# Patient Record
Sex: FEMALE | Race: WHITE | NOT HISPANIC OR LATINO | Employment: UNEMPLOYED | URBAN - NONMETROPOLITAN AREA
[De-identification: names, ages, dates, MRNs, and addresses within clinical notes are randomized per-mention and may not be internally consistent; named-entity substitution may affect disease eponyms.]

---

## 2017-01-09 ENCOUNTER — HOSPITAL ENCOUNTER (OUTPATIENT)
Dept: URGENT CARE | Facility: CLINIC | Age: 22
Discharge: HOME | End: 2017-01-09
Attending: FAMILY MEDICINE | Admitting: FAMILY MEDICINE

## 2017-01-16 ENCOUNTER — ROUTINE PRENATAL (OUTPATIENT)
Dept: OBSTETRICS AND GYNECOLOGY | Facility: CLINIC | Age: 22
End: 2017-01-16

## 2017-01-16 VITALS — BODY MASS INDEX: 27.98 KG/M2 | DIASTOLIC BLOOD PRESSURE: 74 MMHG | WEIGHT: 153 LBS | SYSTOLIC BLOOD PRESSURE: 115 MMHG

## 2017-01-16 DIAGNOSIS — Z3A.22 22 WEEKS GESTATION OF PREGNANCY: ICD-10-CM

## 2017-01-16 DIAGNOSIS — Z34.82 PRENATAL CARE, SUBSEQUENT PREGNANCY, SECOND TRIMESTER: Primary | ICD-10-CM

## 2017-01-16 PROCEDURE — 99212 OFFICE O/P EST SF 10 MIN: CPT | Performed by: ADVANCED PRACTICE MIDWIFE

## 2017-01-16 RX ORDER — PSEUDOEPHEDRINE HCL 30 MG
30 TABLET ORAL 2 TIMES DAILY
COMMUNITY
End: 2017-02-20 | Stop reason: HOSPADM

## 2017-01-16 NOTE — MR AVS SNAPSHOT
Swapna Zarate   2017 10:00 AM   Routine Prenatal    Dept Phone:  [phone]   Encounter #:  [id number]    Provider:  Alda Jernigan CNM   Department:  [facility]                Your Full Care Plan              Today's Medication Changes          These changes are accurate as of: 17 10:27 AM.  If you have any questions, ask your nurse or doctor.               Stop taking medication(s)listed here:     ondansetron ODT 4 MG disintegrating tablet   Commonly known as:  ZOFRAN ODT                      Your Updated Medication List          This list is accurate as of: 17 10:27 AM.  Always use your most recent med list.                docusate sodium 100 MG capsule   Commonly known as:  COLACE   Take 1 capsule by mouth 2 (Two) Times a Day As Needed for constipation.       prenatal (CLASSIC) vitamin 28-0.8 MG tablet tablet       pseudoephedrine 30 MG tablet   Commonly known as:  SUDAFED               You Were Diagnosed With        Codes Comments    Prenatal care, subsequent pregnancy, third trimester    -  Primary ICD-10-CM: Z34.83  ICD-9-CM: V22.1       Instructions     None    Patient Instructions History      Upcoming Appointments     Visit Type Date Time Department    OB FOLLOWUP 2017 10:00 AM  [facility]    OB FOLLOWUP 2017  9:00 AM [facility] [place] Signup     [facility] [employer] allows you to send messages to your doctor, view your test results, renew your prescriptions, schedule appointments, and more. To sign up, go to [employer] and click on the Sign Up Now link in the New User? box. Enter your "[employer] Activation Code exactly as it appears below along with the last four digits of your Social Security Number and your Date of Birth () to complete the sign-up process. If you do not sign up before the expiration date, you must request a new code.    "[employer] Activation Code: [email or web address]  Expires:  1/30/2017 10:27 AM    If you have questions, you can email [email or web address] or call [phone] to talk to our MyChart staff. Remember, [employer] is NOT to be used for urgent needs. For medical emergencies, dial 911.               Other Info from Your Visit           Your Appointments     Feb 13, 2017  9:00 AM CST   OB FOLLOWUP with DANIEL Jacobs   [facility] (--)    [address] [place] [address] Dr  [facility] [address] [place] [id number] [phone]              Other Notes About Your Plan     EIF seen on ultrasound: repeat ultrasound at 32 weeks.        Allergies     No Known Allergies      Reason for Visit     Routine Prenatal Visit           Vital Signs     Blood Pressure Weight Last Menstrual Period Body Mass Index Smoking Status       115/74 153 lb (69.4 kg) (LMP Unknown) 27.98 kg/m2 Never Smoker       Problems and Diagnoses Noted     Prenatal care    -  Primary

## 2017-01-16 NOTE — PROGRESS NOTES
Next visit: diabetes screening  Presents for return OB appointment. ROS: Denies nausea, cramping, spotting, dysuria.  Discussed diabetes and anemia screening for next visit. Discussed weight loss. Patient states her nausea and vomiting has improved and therefore her appetite.

## 2017-02-13 ENCOUNTER — RESULTS ENCOUNTER (OUTPATIENT)
Dept: OBSTETRICS AND GYNECOLOGY | Facility: CLINIC | Age: 22
End: 2017-02-13

## 2017-02-13 DIAGNOSIS — Z34.82 PRENATAL CARE, SUBSEQUENT PREGNANCY, SECOND TRIMESTER: ICD-10-CM

## 2017-02-20 ENCOUNTER — LAB (OUTPATIENT)
Dept: LAB | Facility: HOSPITAL | Age: 22
End: 2017-02-20

## 2017-02-20 ENCOUNTER — ROUTINE PRENATAL (OUTPATIENT)
Dept: OBSTETRICS AND GYNECOLOGY | Facility: CLINIC | Age: 22
End: 2017-02-20

## 2017-02-20 VITALS — WEIGHT: 163 LBS | BODY MASS INDEX: 29.81 KG/M2 | SYSTOLIC BLOOD PRESSURE: 125 MMHG | DIASTOLIC BLOOD PRESSURE: 72 MMHG

## 2017-02-20 DIAGNOSIS — O09.292 PREGNANCY WITH HISTORY OF SPONTANEOUS ABORTION, SECOND TRIMESTER: Primary | ICD-10-CM

## 2017-02-20 DIAGNOSIS — Z34.82 PRENATAL CARE, SUBSEQUENT PREGNANCY, SECOND TRIMESTER: ICD-10-CM

## 2017-02-20 DIAGNOSIS — Z3A.27 27 WEEKS GESTATION OF PREGNANCY: ICD-10-CM

## 2017-02-20 LAB
BASOPHILS # BLD AUTO: 0.01 10*3/MM3 (ref 0–0.2)
BASOPHILS NFR BLD AUTO: 0.1 % (ref 0–2)
DEPRECATED RDW RBC AUTO: 42.5 FL (ref 36.4–46.3)
EOSINOPHIL # BLD AUTO: 0.09 10*3/MM3 (ref 0–0.7)
EOSINOPHIL NFR BLD AUTO: 0.9 % (ref 0–7)
ERYTHROCYTE [DISTWIDTH] IN BLOOD BY AUTOMATED COUNT: 13.1 % (ref 11.5–14.5)
GLUCOSE 1H P 100 G GLC PO SERPL-MCNC: 116 MG/DL (ref 60–140)
HCT VFR BLD AUTO: 33.3 % (ref 35–45)
HGB BLD-MCNC: 11.4 G/DL (ref 12–15.5)
IMM GRANULOCYTES # BLD: 0.01 10*3/MM3 (ref 0–0.02)
IMM GRANULOCYTES NFR BLD: 0.1 % (ref 0–0.5)
LYMPHOCYTES # BLD AUTO: 2.42 10*3/MM3 (ref 0.6–4.2)
LYMPHOCYTES NFR BLD AUTO: 25.1 % (ref 10–50)
MCH RBC QN AUTO: 30.5 PG (ref 26.5–34)
MCHC RBC AUTO-ENTMCNC: 34.2 G/DL (ref 31.4–36)
MCV RBC AUTO: 89 FL (ref 80–98)
MONOCYTES # BLD AUTO: 0.39 10*3/MM3 (ref 0–0.9)
MONOCYTES NFR BLD AUTO: 4 % (ref 0–12)
NEUTROPHILS # BLD AUTO: 6.74 10*3/MM3 (ref 2–8.6)
NEUTROPHILS NFR BLD AUTO: 69.8 % (ref 37–80)
PLATELET # BLD AUTO: 183 10*3/MM3 (ref 150–450)
PMV BLD AUTO: 11 FL (ref 8–12)
RBC # BLD AUTO: 3.74 10*6/MM3 (ref 3.77–5.16)
WBC NRBC COR # BLD: 9.66 10*3/MM3 (ref 3.2–9.8)

## 2017-02-20 PROCEDURE — 36415 COLL VENOUS BLD VENIPUNCTURE: CPT | Performed by: ADVANCED PRACTICE MIDWIFE

## 2017-02-20 PROCEDURE — 85025 COMPLETE CBC W/AUTO DIFF WBC: CPT | Performed by: ADVANCED PRACTICE MIDWIFE

## 2017-02-20 PROCEDURE — 82950 GLUCOSE TEST: CPT | Performed by: ADVANCED PRACTICE MIDWIFE

## 2017-02-20 PROCEDURE — 99212 OFFICE O/P EST SF 10 MIN: CPT | Performed by: ADVANCED PRACTICE MIDWIFE

## 2017-02-20 NOTE — PROGRESS NOTES
Next appointment: Schedule ultrasound follow up at 32 weeks for EIF    Patient presents for follow up OB and diabetes screening. ROS: Reports increased heartburn and is taking Zantac OTC. Denies nausea, vomiting, spotting, dizziness. Reports a rash on both wrists and dry knuckles. Appeared about a week ago. Denies fever, body aches, or chills. Denies exposure to scabies or bed bugs.    Exam: Multiple small clustered pinpoint raised bumps on wrists. Negative for tracks. Skin appears dry and scaly.     Instructed patient to switch to hypoallergenic products and to use Benadryl. Patient will notify of worsening or not improving symptoms. Educated patient of birth control options, pediatricians,  labor symptoms. Patient requested flu shot.

## 2017-03-10 ENCOUNTER — ROUTINE PRENATAL (OUTPATIENT)
Dept: OBSTETRICS AND GYNECOLOGY | Facility: CLINIC | Age: 22
End: 2017-03-10

## 2017-03-10 VITALS — BODY MASS INDEX: 30 KG/M2 | SYSTOLIC BLOOD PRESSURE: 109 MMHG | DIASTOLIC BLOOD PRESSURE: 71 MMHG | WEIGHT: 164 LBS

## 2017-03-10 DIAGNOSIS — O09.293 PREGNANCY WITH HISTORY OF SPONTANEOUS ABORTION, THIRD TRIMESTER: Primary | ICD-10-CM

## 2017-03-10 DIAGNOSIS — Z3A.29 29 WEEKS GESTATION OF PREGNANCY: ICD-10-CM

## 2017-03-10 DIAGNOSIS — Z03.79 SUSPECTED FETAL CONDITION NOT FOUND: ICD-10-CM

## 2017-03-10 PROCEDURE — 99212 OFFICE O/P EST SF 10 MIN: CPT | Performed by: ADVANCED PRACTICE MIDWIFE

## 2017-03-10 NOTE — PROGRESS NOTES
Next appointment: follow up ultrasound for EIF  Patient presents for return OB visit. ROS: Patient reports occasional pain/discomfort between her shoulder blades. States that it will be constant and then subside. Patient denies nausea, vomiting, chest pain, headache.    Reviewed patient's diet and is currently consuming a majority of fatty, greasy foods. Instructed to decrease this and see if this relieves her discomfort. Discussed comfort measures and warning signs of worsening gallbladder symptoms. States understanding.

## 2017-03-24 ENCOUNTER — ROUTINE PRENATAL (OUTPATIENT)
Dept: OBSTETRICS AND GYNECOLOGY | Facility: CLINIC | Age: 22
End: 2017-03-24

## 2017-03-24 VITALS — DIASTOLIC BLOOD PRESSURE: 66 MMHG | WEIGHT: 167 LBS | SYSTOLIC BLOOD PRESSURE: 111 MMHG | BODY MASS INDEX: 30.54 KG/M2

## 2017-03-24 DIAGNOSIS — Z3A.31 31 WEEKS GESTATION OF PREGNANCY: ICD-10-CM

## 2017-03-24 DIAGNOSIS — O09.293 PREGNANCY WITH HISTORY OF SPONTANEOUS ABORTION, THIRD TRIMESTER: Primary | ICD-10-CM

## 2017-03-24 PROCEDURE — 0502F SUBSEQUENT PRENATAL CARE: CPT | Performed by: ADVANCED PRACTICE MIDWIFE

## 2017-03-24 NOTE — PROGRESS NOTES
Patient presents for follow up ultrasound due to EIF. ROS: denies spotting, headache, dizziness, dysuria    Ultrasound shows EF 4 lb 8 oz (61%), MARY 25.9cm, [id number], cephalic presentation, anterior placenta, 3 vessel cord, anatomy within normal limits.     Awaiting final report for follow up due to MARY.

## 2017-04-07 ENCOUNTER — ROUTINE PRENATAL (OUTPATIENT)
Dept: OBSTETRICS AND GYNECOLOGY | Facility: CLINIC | Age: 22
End: 2017-04-07

## 2017-04-07 VITALS — SYSTOLIC BLOOD PRESSURE: 118 MMHG | WEIGHT: 172 LBS | DIASTOLIC BLOOD PRESSURE: 71 MMHG | BODY MASS INDEX: 31.46 KG/M2

## 2017-04-07 DIAGNOSIS — Z3A.33 33 WEEKS GESTATION OF PREGNANCY: ICD-10-CM

## 2017-04-07 DIAGNOSIS — O09.293 PREGNANCY WITH HISTORY OF SPONTANEOUS ABORTION, THIRD TRIMESTER: Primary | ICD-10-CM

## 2017-04-07 PROCEDURE — 0502F SUBSEQUENT PRENATAL CARE: CPT | Performed by: ADVANCED PRACTICE MIDWIFE

## 2017-04-07 NOTE — PROGRESS NOTES
Next visit: GBS swab  CC: OLEG visit, history reviewed see history tabs.     ROS:   Negative leaking fluid from the vagina, swelling in her legs, headache, visual changes, low back pain, nausea and dysuria    Educated on: [facility], GBS swab    Plan: f/u in 2 week/s    NANDA Fleming   I have reviewed the notes, assessments, and/or procedures performed by Pauline bennett, I concur with her/his documentation of Swapna Zarate.

## 2017-04-19 RX ORDER — VALACYCLOVIR HYDROCHLORIDE 500 MG/1
TABLET, FILM COATED ORAL
Qty: 30 TABLET | Refills: 1 | Status: SHIPPED | OUTPATIENT
Start: 2017-04-19 | End: 2017-04-24 | Stop reason: SDUPTHER

## 2017-04-19 RX ORDER — VALACYCLOVIR HYDROCHLORIDE 500 MG/1
500 TABLET, FILM COATED ORAL 2 TIMES DAILY
Qty: 6 TABLET | Refills: 0 | Status: SHIPPED | OUTPATIENT
Start: 2017-04-19 | End: 2017-05-02

## 2017-04-24 ENCOUNTER — ROUTINE PRENATAL (OUTPATIENT)
Dept: OBSTETRICS AND GYNECOLOGY | Facility: CLINIC | Age: 22
End: 2017-04-24

## 2017-04-24 VITALS — BODY MASS INDEX: 31.64 KG/M2 | DIASTOLIC BLOOD PRESSURE: 73 MMHG | WEIGHT: 173 LBS | SYSTOLIC BLOOD PRESSURE: 110 MMHG

## 2017-04-24 DIAGNOSIS — Z3A.36 36 WEEKS GESTATION OF PREGNANCY: ICD-10-CM

## 2017-04-24 DIAGNOSIS — Z36.85 ANTENATAL SCREENING FOR STREPTOCOCCUS B: ICD-10-CM

## 2017-04-24 DIAGNOSIS — O09.293 PREGNANCY WITH HISTORY OF SPONTANEOUS ABORTION, THIRD TRIMESTER: Primary | ICD-10-CM

## 2017-04-24 PROCEDURE — 87653 STREP B DNA AMP PROBE: CPT | Performed by: ADVANCED PRACTICE MIDWIFE

## 2017-04-24 PROCEDURE — 0502F SUBSEQUENT PRENATAL CARE: CPT | Performed by: ADVANCED PRACTICE MIDWIFE

## 2017-04-24 RX ORDER — VALACYCLOVIR HYDROCHLORIDE 500 MG/1
TABLET, FILM COATED ORAL
Qty: 30 TABLET | Refills: 1 | Status: SHIPPED | OUTPATIENT
Start: 2017-04-24 | End: 2017-05-25 | Stop reason: HOSPADM

## 2017-04-24 NOTE — PROGRESS NOTES
Patient presents for return OB visit. Patient recently treated for herpes outbreak. Reviewed importance of suppression therapy. Patient understands recommendation for c/s if outbreak present at time of delivery. ROS: Outbreak has cleared. Denies BH contractions, spotting, dysuria.     Cervix closed/-3/posterior. GBS collected.     Suppression therapy resent to pharmacy

## 2017-04-25 LAB — GROUP B STREP, DNA: NEGATIVE

## 2017-05-02 ENCOUNTER — ROUTINE PRENATAL (OUTPATIENT)
Dept: OBSTETRICS AND GYNECOLOGY | Facility: CLINIC | Age: 22
End: 2017-05-02

## 2017-05-02 VITALS — WEIGHT: 175 LBS | DIASTOLIC BLOOD PRESSURE: 71 MMHG | BODY MASS INDEX: 32.01 KG/M2 | SYSTOLIC BLOOD PRESSURE: 106 MMHG

## 2017-05-02 DIAGNOSIS — Z3A.37 37 WEEKS GESTATION OF PREGNANCY: ICD-10-CM

## 2017-05-02 DIAGNOSIS — O09.293 PREGNANCY WITH HISTORY OF SPONTANEOUS ABORTION, THIRD TRIMESTER: Primary | ICD-10-CM

## 2017-05-02 PROCEDURE — 0502F SUBSEQUENT PRENATAL CARE: CPT | Performed by: ADVANCED PRACTICE MIDWIFE

## 2017-05-02 RX ORDER — SERTRALINE HYDROCHLORIDE 25 MG/1
TABLET, FILM COATED ORAL
Qty: 49 TABLET | Refills: 0 | Status: SHIPPED | OUTPATIENT
Start: 2017-05-02 | End: 2017-06-08

## 2017-05-10 ENCOUNTER — ROUTINE PRENATAL (OUTPATIENT)
Dept: OBSTETRICS AND GYNECOLOGY | Facility: CLINIC | Age: 22
End: 2017-05-10

## 2017-05-10 VITALS — DIASTOLIC BLOOD PRESSURE: 70 MMHG | SYSTOLIC BLOOD PRESSURE: 110 MMHG | WEIGHT: 174 LBS | BODY MASS INDEX: 31.83 KG/M2

## 2017-05-10 DIAGNOSIS — Z3A.38 38 WEEKS GESTATION OF PREGNANCY: ICD-10-CM

## 2017-05-10 DIAGNOSIS — O09.293 PREGNANCY WITH HISTORY OF SPONTANEOUS ABORTION, THIRD TRIMESTER: Primary | ICD-10-CM

## 2017-05-10 PROCEDURE — 0502F SUBSEQUENT PRENATAL CARE: CPT | Performed by: ADVANCED PRACTICE MIDWIFE

## 2017-05-15 ENCOUNTER — ROUTINE PRENATAL (OUTPATIENT)
Dept: OBSTETRICS AND GYNECOLOGY | Facility: CLINIC | Age: 22
End: 2017-05-15

## 2017-05-15 VITALS — SYSTOLIC BLOOD PRESSURE: 119 MMHG | WEIGHT: 177 LBS | BODY MASS INDEX: 32.37 KG/M2 | DIASTOLIC BLOOD PRESSURE: 82 MMHG

## 2017-05-15 DIAGNOSIS — Z3A.39 39 WEEKS GESTATION OF PREGNANCY: ICD-10-CM

## 2017-05-15 DIAGNOSIS — O48.0 POST-TERM PREGNANCY, 40-42 WEEKS OF GESTATION: ICD-10-CM

## 2017-05-15 DIAGNOSIS — O09.293 PREGNANCY WITH HISTORY OF SPONTANEOUS ABORTION, THIRD TRIMESTER: Primary | ICD-10-CM

## 2017-05-15 PROCEDURE — 0502F SUBSEQUENT PRENATAL CARE: CPT | Performed by: ADVANCED PRACTICE MIDWIFE

## 2017-05-16 ENCOUNTER — HOSPITAL ENCOUNTER (OUTPATIENT)
Facility: HOSPITAL | Age: 22
Discharge: HOME | End: 2017-05-16
Attending: OBSTETRICS & GYNECOLOGY | Admitting: OBSTETRICS & GYNECOLOGY

## 2017-05-16 VITALS
BODY MASS INDEX: 32.57 KG/M2 | RESPIRATION RATE: 18 BRPM | TEMPERATURE: 98.1 F | WEIGHT: 177 LBS | HEART RATE: 88 BPM | HEIGHT: 62 IN | DIASTOLIC BLOOD PRESSURE: 76 MMHG | SYSTOLIC BLOOD PRESSURE: 128 MMHG | OXYGEN SATURATION: 98 %

## 2017-05-16 DIAGNOSIS — N89.8 VAGINAL IRRITATION: Primary | ICD-10-CM

## 2017-05-16 PROCEDURE — G0463 HOSPITAL OUTPT CLINIC VISIT: HCPCS

## 2017-05-16 PROCEDURE — 59025 FETAL NON-STRESS TEST: CPT | Performed by: OBSTETRICS & GYNECOLOGY

## 2017-05-16 PROCEDURE — 59025 FETAL NON-STRESS TEST: CPT

## 2017-05-24 ENCOUNTER — HOSPITAL ENCOUNTER (INPATIENT)
Facility: HOSPITAL | Age: 22
LOS: 1 days | Discharge: HOME | End: 2017-05-25
Attending: OBSTETRICS & GYNECOLOGY | Admitting: OBSTETRICS & GYNECOLOGY

## 2017-05-24 PROBLEM — Z34.90 PREGNANCY: Status: ACTIVE | Noted: 2017-05-24

## 2017-05-24 LAB
ABO GROUP BLD: NORMAL
AMPHET+METHAMPHET UR QL: NEGATIVE
BARBITURATES UR QL SCN: NEGATIVE
BENZODIAZ UR QL SCN: NEGATIVE
BLD GP AB SCN SERPL QL: NEGATIVE
CANNABINOIDS SERPL QL: NEGATIVE
COCAINE UR QL: NEGATIVE
DEPRECATED RDW RBC AUTO: 43.1 FL (ref 36.4–46.3)
ERYTHROCYTE [DISTWIDTH] IN BLOOD BY AUTOMATED COUNT: 14.9 % (ref 11.5–14.5)
HCT VFR BLD AUTO: 36.5 % (ref 35–45)
HGB BLD-MCNC: 12.3 G/DL (ref 12–15.5)
HOLD SPECIMEN: NORMAL
Lab: NORMAL
MCH RBC QN AUTO: 26.6 PG (ref 26.5–34)
MCHC RBC AUTO-ENTMCNC: 33.7 G/DL (ref 31.4–36)
MCV RBC AUTO: 79 FL (ref 80–98)
METHADONE UR QL SCN: NEGATIVE
OPIATES UR QL: NEGATIVE
OXYCODONE UR QL SCN: NEGATIVE
PLATELET # BLD AUTO: 192 10*3/MM3 (ref 150–450)
PMV BLD AUTO: ABNORMAL FL (ref 8–12)
RBC # BLD AUTO: 4.62 10*6/MM3 (ref 3.77–5.16)
RH BLD: POSITIVE
WBC NRBC COR # BLD: 14.42 10*3/MM3 (ref 3.2–9.8)

## 2017-05-24 PROCEDURE — 80307 DRUG TEST PRSMV CHEM ANLYZR: CPT | Performed by: OBSTETRICS & GYNECOLOGY

## 2017-05-24 PROCEDURE — 25010000002 BUTORPHANOL PER 1 MG

## 2017-05-24 PROCEDURE — 90715 TDAP VACCINE 7 YRS/> IM: CPT | Performed by: ADVANCED PRACTICE MIDWIFE

## 2017-05-24 PROCEDURE — 90471 IMMUNIZATION ADMIN: CPT | Performed by: ADVANCED PRACTICE MIDWIFE

## 2017-05-24 PROCEDURE — 59400 OBSTETRICAL CARE: CPT | Performed by: ADVANCED PRACTICE MIDWIFE

## 2017-05-24 PROCEDURE — 85027 COMPLETE CBC AUTOMATED: CPT | Performed by: OBSTETRICS & GYNECOLOGY

## 2017-05-24 PROCEDURE — 86900 BLOOD TYPING SEROLOGIC ABO: CPT | Performed by: OBSTETRICS & GYNECOLOGY

## 2017-05-24 PROCEDURE — 25010000002 TDAP 5-2.5-18.5 LF-MCG/0.5 SUSPENSION: Performed by: ADVANCED PRACTICE MIDWIFE

## 2017-05-24 PROCEDURE — 86901 BLOOD TYPING SEROLOGIC RH(D): CPT | Performed by: OBSTETRICS & GYNECOLOGY

## 2017-05-24 PROCEDURE — 86850 RBC ANTIBODY SCREEN: CPT | Performed by: OBSTETRICS & GYNECOLOGY

## 2017-05-24 RX ORDER — MISOPROSTOL 200 UG/1
800 TABLET ORAL AS NEEDED
Status: DISCONTINUED | OUTPATIENT
Start: 2017-05-24 | End: 2017-05-24 | Stop reason: HOSPADM

## 2017-05-24 RX ORDER — SODIUM CHLORIDE 0.9 % (FLUSH) 0.9 %
1-10 SYRINGE (ML) INJECTION AS NEEDED
Status: DISCONTINUED | OUTPATIENT
Start: 2017-05-24 | End: 2017-05-24

## 2017-05-24 RX ORDER — BUTORPHANOL TARTRATE 1 MG/ML
INJECTION, SOLUTION INTRAMUSCULAR; INTRAVENOUS
Status: COMPLETED
Start: 2017-05-24 | End: 2017-05-24

## 2017-05-24 RX ORDER — SODIUM CHLORIDE, SODIUM LACTATE, POTASSIUM CHLORIDE, CALCIUM CHLORIDE 600; 310; 30; 20 MG/100ML; MG/100ML; MG/100ML; MG/100ML
125 INJECTION, SOLUTION INTRAVENOUS CONTINUOUS
Status: DISCONTINUED | OUTPATIENT
Start: 2017-05-24 | End: 2017-05-24

## 2017-05-24 RX ORDER — ACETAMINOPHEN 325 MG/1
650 TABLET ORAL EVERY 4 HOURS PRN
Status: DISCONTINUED | OUTPATIENT
Start: 2017-05-24 | End: 2017-05-25 | Stop reason: HOSPADM

## 2017-05-24 RX ORDER — CARBOPROST TROMETHAMINE 250 UG/ML
250 INJECTION, SOLUTION INTRAMUSCULAR AS NEEDED
Status: DISCONTINUED | OUTPATIENT
Start: 2017-05-24 | End: 2017-05-24 | Stop reason: HOSPADM

## 2017-05-24 RX ORDER — LIDOCAINE HYDROCHLORIDE 10 MG/ML
5 INJECTION, SOLUTION INFILTRATION; PERINEURAL AS NEEDED
Status: DISCONTINUED | OUTPATIENT
Start: 2017-05-24 | End: 2017-05-24

## 2017-05-24 RX ORDER — METHYLERGONOVINE MALEATE 0.2 MG/ML
200 INJECTION INTRAVENOUS ONCE AS NEEDED
Status: DISCONTINUED | OUTPATIENT
Start: 2017-05-24 | End: 2017-05-24 | Stop reason: HOSPADM

## 2017-05-24 RX ORDER — IBUPROFEN 800 MG/1
800 TABLET ORAL EVERY 6 HOURS PRN
Status: DISCONTINUED | OUTPATIENT
Start: 2017-05-24 | End: 2017-05-25 | Stop reason: HOSPADM

## 2017-05-24 RX ORDER — SODIUM CHLORIDE 0.9 % (FLUSH) 0.9 %
1-10 SYRINGE (ML) INJECTION AS NEEDED
Status: DISCONTINUED | OUTPATIENT
Start: 2017-05-24 | End: 2017-05-25 | Stop reason: HOSPADM

## 2017-05-24 RX ORDER — SODIUM CHLORIDE 0.9 % (FLUSH) 0.9 %
SYRINGE (ML) INJECTION
Status: DISPENSED
Start: 2017-05-24 | End: 2017-05-25

## 2017-05-24 RX ORDER — OXYTOCIN/RINGER'S LACTATE 20/1000 ML
PLASTIC BAG, INJECTION (ML) INTRAVENOUS
Status: COMPLETED
Start: 2017-05-24 | End: 2017-05-24

## 2017-05-24 RX ORDER — BUTORPHANOL TARTRATE 1 MG/ML
1 INJECTION, SOLUTION INTRAMUSCULAR; INTRAVENOUS ONCE
Status: COMPLETED | OUTPATIENT
Start: 2017-05-24 | End: 2017-05-24

## 2017-05-24 RX ORDER — DOCUSATE SODIUM 100 MG/1
100 CAPSULE, LIQUID FILLED ORAL 2 TIMES DAILY
Status: DISCONTINUED | OUTPATIENT
Start: 2017-05-24 | End: 2017-05-25 | Stop reason: HOSPADM

## 2017-05-24 RX ORDER — CALCIUM CARBONATE 200(500)MG
2 TABLET,CHEWABLE ORAL 3 TIMES DAILY PRN
Status: DISCONTINUED | OUTPATIENT
Start: 2017-05-24 | End: 2017-05-25 | Stop reason: HOSPADM

## 2017-05-24 RX ORDER — SODIUM CHLORIDE, SODIUM LACTATE, POTASSIUM CHLORIDE, CALCIUM CHLORIDE 600; 310; 30; 20 MG/100ML; MG/100ML; MG/100ML; MG/100ML
INJECTION, SOLUTION INTRAVENOUS
Status: DISPENSED
Start: 2017-05-24 | End: 2017-05-24

## 2017-05-24 RX ORDER — LANOLIN 100 %
OINTMENT (GRAM) TOPICAL
Status: DISCONTINUED | OUTPATIENT
Start: 2017-05-24 | End: 2017-05-25 | Stop reason: HOSPADM

## 2017-05-24 RX ADMIN — TETANUS TOXOID, REDUCED DIPHTHERIA TOXOID AND ACELLULAR PERTUSSIS VACCINE, ADSORBED 0.5 ML: 5; 2.5; 8; 8; 2.5 SUSPENSION INTRAMUSCULAR at 17:56

## 2017-05-24 RX ADMIN — Medication: at 11:22

## 2017-05-24 RX ADMIN — BUTORPHANOL TARTRATE 1 MG: 1 INJECTION, SOLUTION INTRAMUSCULAR; INTRAVENOUS at 10:29

## 2017-05-24 RX ADMIN — IBUPROFEN 800 MG: 800 TABLET ORAL at 16:18

## 2017-05-24 RX ADMIN — BENZOCAINE AND MENTHOL: 20; .5 SPRAY TOPICAL at 13:40

## 2017-05-24 RX ADMIN — DOCUSATE SODIUM 100 MG: 100 CAPSULE, LIQUID FILLED ORAL at 18:07

## 2017-05-25 VITALS
SYSTOLIC BLOOD PRESSURE: 119 MMHG | WEIGHT: 172 LBS | DIASTOLIC BLOOD PRESSURE: 71 MMHG | HEIGHT: 62 IN | OXYGEN SATURATION: 98 % | HEART RATE: 95 BPM | BODY MASS INDEX: 31.65 KG/M2 | RESPIRATION RATE: 16 BRPM | TEMPERATURE: 98.3 F

## 2017-05-25 PROBLEM — Z34.90 PREGNANCY: Status: RESOLVED | Noted: 2017-05-24 | Resolved: 2017-05-25

## 2017-05-25 RX ADMIN — DOCUSATE SODIUM 100 MG: 100 CAPSULE, LIQUID FILLED ORAL at 10:34

## 2017-05-25 RX ADMIN — IBUPROFEN 800 MG: 800 TABLET ORAL at 06:05

## 2017-05-28 ENCOUNTER — APPOINTMENT (OUTPATIENT)
Dept: CT IMAGING | Facility: HOSPITAL | Age: 22
End: 2017-05-28

## 2017-05-28 ENCOUNTER — HOSPITAL ENCOUNTER (EMERGENCY)
Facility: HOSPITAL | Age: 22
Discharge: HOME | End: 2017-05-28
Attending: EMERGENCY MEDICINE | Admitting: EMERGENCY MEDICINE

## 2017-05-28 VITALS
OXYGEN SATURATION: 100 % | SYSTOLIC BLOOD PRESSURE: 128 MMHG | HEIGHT: 63 IN | HEART RATE: 118 BPM | TEMPERATURE: 99.1 F | WEIGHT: 167.5 LBS | DIASTOLIC BLOOD PRESSURE: 80 MMHG | RESPIRATION RATE: 16 BRPM | BODY MASS INDEX: 29.68 KG/M2

## 2017-05-28 DIAGNOSIS — R10.9 ABDOMINAL PAIN, UNSPECIFIED LOCATION: Primary | ICD-10-CM

## 2017-05-28 DIAGNOSIS — E87.6 HYPOKALEMIA: ICD-10-CM

## 2017-05-28 DIAGNOSIS — N39.0 URINARY TRACT INFECTION, SITE UNSPECIFIED: ICD-10-CM

## 2017-05-28 DIAGNOSIS — D72.829 LEUKOCYTOSIS, UNSPECIFIED TYPE: ICD-10-CM

## 2017-05-28 DIAGNOSIS — K59.00 CONSTIPATION, UNSPECIFIED CONSTIPATION TYPE: ICD-10-CM

## 2017-05-28 LAB
ALBUMIN SERPL-MCNC: 3.2 G/DL (ref 3.4–4.8)
ALBUMIN/GLOB SERPL: 1 G/DL (ref 1.1–1.8)
ALP SERPL-CCNC: 123 U/L (ref 38–126)
ALT SERPL W P-5'-P-CCNC: 26 U/L (ref 9–52)
ANION GAP SERPL CALCULATED.3IONS-SCNC: 10 MMOL/L (ref 5–15)
AST SERPL-CCNC: 17 U/L (ref 14–36)
BACTERIA UR QL AUTO: ABNORMAL /HPF
BASOPHILS # BLD AUTO: 0.01 10*3/MM3 (ref 0–0.2)
BASOPHILS NFR BLD AUTO: 0.1 % (ref 0–2)
BILIRUB SERPL-MCNC: 0.4 MG/DL (ref 0.2–1.3)
BILIRUB UR QL STRIP: NEGATIVE
BUN BLD-MCNC: 8 MG/DL (ref 7–21)
BUN/CREAT SERPL: 13.3 (ref 7–25)
CALCIUM SPEC-SCNC: 8.7 MG/DL (ref 8.4–10.2)
CHLORIDE SERPL-SCNC: 101 MMOL/L (ref 95–110)
CLARITY UR: ABNORMAL
CO2 SERPL-SCNC: 24 MMOL/L (ref 22–31)
COLOR UR: YELLOW
CREAT BLD-MCNC: 0.6 MG/DL (ref 0.5–1)
D-LACTATE SERPL-SCNC: 1.1 MMOL/L (ref 0.5–2)
DEPRECATED RDW RBC AUTO: 46.5 FL (ref 36.4–46.3)
EOSINOPHIL # BLD AUTO: 0.11 10*3/MM3 (ref 0–0.7)
EOSINOPHIL NFR BLD AUTO: 0.8 % (ref 0–7)
ERYTHROCYTE [DISTWIDTH] IN BLOOD BY AUTOMATED COUNT: 15.3 % (ref 11.5–14.5)
GFR SERPL CREATININE-BSD FRML MDRD: 124 ML/MIN/1.73 (ref 60–165)
GLOBULIN UR ELPH-MCNC: 3.3 GM/DL (ref 2.3–3.5)
GLUCOSE BLD-MCNC: 97 MG/DL (ref 60–100)
GLUCOSE UR STRIP-MCNC: NEGATIVE MG/DL
HCT VFR BLD AUTO: 36 % (ref 35–45)
HGB BLD-MCNC: 11.6 G/DL (ref 12–15.5)
HGB UR QL STRIP.AUTO: ABNORMAL
HOLD SPECIMEN: NORMAL
HOLD SPECIMEN: NORMAL
HYALINE CASTS UR QL AUTO: ABNORMAL /LPF
IMM GRANULOCYTES # BLD: 0.03 10*3/MM3 (ref 0–0.02)
IMM GRANULOCYTES NFR BLD: 0.2 % (ref 0–0.5)
KETONES UR QL STRIP: NEGATIVE
LEUKOCYTE ESTERASE UR QL STRIP.AUTO: ABNORMAL
LIPASE SERPL-CCNC: 48 U/L (ref 23–300)
LYMPHOCYTES # BLD AUTO: 2.01 10*3/MM3 (ref 0.6–4.2)
LYMPHOCYTES NFR BLD AUTO: 14.6 % (ref 10–50)
MCH RBC QN AUTO: 26.8 PG (ref 26.5–34)
MCHC RBC AUTO-ENTMCNC: 32.2 G/DL (ref 31.4–36)
MCV RBC AUTO: 83.1 FL (ref 80–98)
MONOCYTES # BLD AUTO: 0.58 10*3/MM3 (ref 0–0.9)
MONOCYTES NFR BLD AUTO: 4.2 % (ref 0–12)
NEUTROPHILS # BLD AUTO: 10.99 10*3/MM3 (ref 2–8.6)
NEUTROPHILS NFR BLD AUTO: 80.1 % (ref 37–80)
NITRITE UR QL STRIP: NEGATIVE
PH UR STRIP.AUTO: 7 [PH] (ref 5–9)
PLATELET # BLD AUTO: 217 10*3/MM3 (ref 150–450)
PMV BLD AUTO: 11.1 FL (ref 8–12)
POTASSIUM BLD-SCNC: 3.8 MMOL/L (ref 3.5–5.1)
PROT SERPL-MCNC: 6.5 G/DL (ref 6.3–8.6)
PROT UR QL STRIP: ABNORMAL
RBC # BLD AUTO: 4.33 10*6/MM3 (ref 3.77–5.16)
RBC # UR: ABNORMAL /HPF
REF LAB TEST METHOD: ABNORMAL
SODIUM BLD-SCNC: 135 MMOL/L (ref 137–145)
SP GR UR STRIP: 1.01 (ref 1–1.03)
SQUAMOUS #/AREA URNS HPF: ABNORMAL /HPF
UROBILINOGEN UR QL STRIP: ABNORMAL
WBC NRBC COR # BLD: 13.73 10*3/MM3 (ref 3.2–9.8)
WBC UR QL AUTO: ABNORMAL /HPF
WHOLE BLOOD HOLD SPECIMEN: NORMAL
WHOLE BLOOD HOLD SPECIMEN: NORMAL

## 2017-05-28 PROCEDURE — 25010000002 KETOROLAC TROMETHAMINE PER 15 MG: Performed by: EMERGENCY MEDICINE

## 2017-05-28 PROCEDURE — 96365 THER/PROPH/DIAG IV INF INIT: CPT

## 2017-05-28 PROCEDURE — 99284 EMERGENCY DEPT VISIT MOD MDM: CPT

## 2017-05-28 PROCEDURE — 74177 CT ABD & PELVIS W/CONTRAST: CPT

## 2017-05-28 PROCEDURE — 96375 TX/PRO/DX INJ NEW DRUG ADDON: CPT

## 2017-05-28 PROCEDURE — 83690 ASSAY OF LIPASE: CPT | Performed by: EMERGENCY MEDICINE

## 2017-05-28 PROCEDURE — 83605 ASSAY OF LACTIC ACID: CPT | Performed by: EMERGENCY MEDICINE

## 2017-05-28 PROCEDURE — 87086 URINE CULTURE/COLONY COUNT: CPT | Performed by: EMERGENCY MEDICINE

## 2017-05-28 PROCEDURE — 81001 URINALYSIS AUTO W/SCOPE: CPT | Performed by: EMERGENCY MEDICINE

## 2017-05-28 PROCEDURE — 25010000002 CEFTRIAXONE: Performed by: EMERGENCY MEDICINE

## 2017-05-28 PROCEDURE — 85025 COMPLETE CBC W/AUTO DIFF WBC: CPT | Performed by: EMERGENCY MEDICINE

## 2017-05-28 PROCEDURE — 96361 HYDRATE IV INFUSION ADD-ON: CPT

## 2017-05-28 PROCEDURE — 80053 COMPREHEN METABOLIC PANEL: CPT | Performed by: EMERGENCY MEDICINE

## 2017-05-28 PROCEDURE — 0 IOPAMIDOL 61 % SOLUTION: Performed by: EMERGENCY MEDICINE

## 2017-05-28 RX ORDER — KETOROLAC TROMETHAMINE 30 MG/ML
30 INJECTION, SOLUTION INTRAMUSCULAR; INTRAVENOUS ONCE
Status: COMPLETED | OUTPATIENT
Start: 2017-05-28 | End: 2017-05-28

## 2017-05-28 RX ORDER — SULFAMETHOXAZOLE AND TRIMETHOPRIM 800; 160 MG/1; MG/1
1 TABLET ORAL ONCE
Status: COMPLETED | OUTPATIENT
Start: 2017-05-28 | End: 2017-05-28

## 2017-05-28 RX ORDER — POTASSIUM CHLORIDE 750 MG/1
40 CAPSULE, EXTENDED RELEASE ORAL ONCE
Status: COMPLETED | OUTPATIENT
Start: 2017-05-28 | End: 2017-05-28

## 2017-05-28 RX ORDER — SULFAMETHOXAZOLE AND TRIMETHOPRIM 800; 160 MG/1; MG/1
1 TABLET ORAL 2 TIMES DAILY
Qty: 20 TABLET | Refills: 0 | Status: SHIPPED | OUTPATIENT
Start: 2017-05-28 | End: 2017-05-28 | Stop reason: HOSPADM

## 2017-05-28 RX ORDER — AMOXICILLIN 500 MG/1
1000 CAPSULE ORAL 3 TIMES DAILY
Qty: 30 CAPSULE | Refills: 0 | Status: SHIPPED | OUTPATIENT
Start: 2017-05-28 | End: 2017-06-08

## 2017-05-28 RX ADMIN — SODIUM CHLORIDE 1000 ML: 9 INJECTION, SOLUTION INTRAVENOUS at 18:45

## 2017-05-28 RX ADMIN — CEFTRIAXONE 1 G: 1 INJECTION, POWDER, FOR SOLUTION INTRAMUSCULAR; INTRAVENOUS at 19:28

## 2017-05-28 RX ADMIN — KETOROLAC TROMETHAMINE 30 MG: 30 INJECTION, SOLUTION INTRAMUSCULAR; INTRAVENOUS at 18:45

## 2017-05-28 RX ADMIN — IOPAMIDOL 94 ML: 612 INJECTION, SOLUTION INTRAVENOUS at 20:03

## 2017-05-28 RX ADMIN — SULFAMETHOXAZOLE AND TRIMETHOPRIM 160 MG: 800; 160 TABLET ORAL at 20:52

## 2017-05-28 RX ADMIN — POTASSIUM CHLORIDE 40 MEQ: 750 CAPSULE, EXTENDED RELEASE ORAL at 20:53

## 2017-05-30 LAB — BACTERIA SPEC AEROBE CULT: NORMAL

## 2017-06-08 ENCOUNTER — OFFICE VISIT (OUTPATIENT)
Dept: FAMILY MEDICINE CLINIC | Facility: CLINIC | Age: 22
End: 2017-06-08

## 2017-06-08 ENCOUNTER — OFFICE VISIT (OUTPATIENT)
Dept: OBSTETRICS AND GYNECOLOGY | Facility: CLINIC | Age: 22
End: 2017-06-08

## 2017-06-08 VITALS
SYSTOLIC BLOOD PRESSURE: 124 MMHG | OXYGEN SATURATION: 98 % | HEIGHT: 63 IN | DIASTOLIC BLOOD PRESSURE: 70 MMHG | HEART RATE: 79 BPM | WEIGHT: 164.31 LBS | BODY MASS INDEX: 29.11 KG/M2

## 2017-06-08 VITALS
WEIGHT: 162 LBS | BODY MASS INDEX: 28.7 KG/M2 | SYSTOLIC BLOOD PRESSURE: 102 MMHG | DIASTOLIC BLOOD PRESSURE: 68 MMHG | HEIGHT: 63 IN

## 2017-06-08 DIAGNOSIS — Z76.89 ESTABLISHING CARE WITH NEW DOCTOR, ENCOUNTER FOR: Primary | ICD-10-CM

## 2017-06-08 DIAGNOSIS — Z78.9: ICD-10-CM

## 2017-06-08 PROCEDURE — 0503F POSTPARTUM CARE VISIT: CPT | Performed by: ADVANCED PRACTICE MIDWIFE

## 2017-06-08 PROCEDURE — 99203 OFFICE O/P NEW LOW 30 MIN: CPT | Performed by: FAMILY MEDICINE

## 2017-06-08 NOTE — PROGRESS NOTES
ID: Swapna Vidales    CC:   Chief Complaint   Patient presents with   • Establish Care   • Urinary Tract Infection   • hypokalemia   • Constipation       Subjective:     Swapna Vidales is a 21 y.o. female who presents for follow up for UTI.   Patient was seen in the ED on 17 and was diagnosed with a UTI and was prescribed amoxicillin.  Patient states she was suppose to take it 2 times a day for 10 days but has a couple pills left.  Reading the prescription it says to take 2 pills TID and was given 30 pills for a 5 day supply.    Patient denies dysuria, hematuria, increased frequency, lower abdominal pain.  Pmhx: Irregular Menses  Pshx: None  Fhx: Mother: hypothyroidism Dad: Diabetes mellitus 2, no family history of breast or colon cancer  LMP: 2016  Pap smear: 3 years ago normal, has scheduled pap smear with Alda in 4 weeks  Obstetrics:   Specialist: Alda    Preventative:  Over the past 2 weeks, have you felt down, depressed, or hopeless?No   Over the past 2 weeks, have you felt little interest or pleasure in doing things?No  Clinical depression screening refused by patient.No PHQ9:0    On osteoporosis therapy?Not Indicated     Past Medical Hx:  Past Medical History:   Diagnosis Date   • Abdominal pain    • Acute maxillary sinusitis    • Acute pharyngitis    • Acute sinusitis    • Acute tonsillitis    • Acute upper respiratory infection    • Allergic rhinitis    • Bacterial vaginosis    • Bronchitis    • Conjunctivitis    • Cystitis    • Diarrhea    • Encounter for general counseling and advice on contraceptive management    • Encounter for  visit      visit status     • Encounters for administrative purposes     Exam, medical, administrative    • Gastroenteritis    • Genital herpes simplex    • Increased frequency of urination    • Influenza    • Irregular menstruation     unspecified     • Nausea and vomiting    • Otitis media    • Patient currently  pregnant    • Pelvic pain     C/O pelvic pain    • Respiratory syncytial virus infection     three months of age     • Routine postpartum follow-up    • Streptococcal sore throat    • Upper respiratory infection    • Urinary tract infectious disease        Past Surgical Hx:  Past Surgical History:   Procedure Laterality Date   • VAGINAL DELIVERY  09/04/2013    Spontaneous vaginal delivery at term.       Health Maintenance:  Health Maintenance   Topic Date Due   • PAP SMEAR  08/19/2016   • INFLUENZA VACCINE  08/01/2017   • TDAP/TD VACCINES (2 - Td) 05/24/2027       Current Meds:    Current Outpatient Prescriptions:   •  Prenatal Vit-Fe Fumarate-FA (PRENATAL, CLASSIC, VITAMIN) 28-0.8 MG tablet tablet, Take  by mouth Daily., Disp: , Rfl:     Allergies:  Review of patient's allergies indicates no known allergies.    Family Hx:  Family History   Problem Relation Age of Onset   • Hyperlipidemia Father      High Cholesterol   • Heart block Father    • Ovarian cysts Sister         Social History:  Social History     Social History   • Marital status:      Spouse name: N/A   • Number of children: N/A   • Years of education: N/A     Occupational History   • Not on file.     Social History Main Topics   • Smoking status: Never Smoker   • Smokeless tobacco: Never Used   • Alcohol use No   • Drug use: No   • Sexual activity: Yes     Other Topics Concern   • Not on file     Social History Narrative       Review of Systems   Constitutional: Negative for appetite change, chills, diaphoresis, fatigue and fever.   HENT: Negative for congestion, nosebleeds, postnasal drip, rhinorrhea, sinus pressure, sneezing, sore throat, tinnitus and voice change.    Eyes: Negative for pain, redness and itching.   Respiratory: Negative for cough, chest tightness, shortness of breath and wheezing.    Cardiovascular: Negative for chest pain, palpitations and leg swelling.   Gastrointestinal: Negative for abdominal pain, constipation, diarrhea,  "nausea and vomiting.   Genitourinary: Negative for dysuria and hematuria.   Musculoskeletal: Negative for back pain, myalgias and neck stiffness.   Skin: Negative for rash and wound.   Allergic/Immunologic: Negative for environmental allergies and food allergies.   Neurological: Negative for dizziness, tremors, seizures, weakness and light-headedness.   Psychiatric/Behavioral: Negative for agitation, hallucinations, self-injury and suicidal ideas.           Objective:     /70 (BP Location: Left arm, Patient Position: Sitting, Cuff Size: Adult)  Pulse 79  Ht 63\" (160 cm)  Wt 164 lb 5 oz (74.5 kg)  SpO2 98%  BMI 29.11 kg/m2    Physical Exam   Constitutional: She is oriented to person, place, and time. She appears well-developed and well-nourished. No distress.   HENT:   Head: Normocephalic and atraumatic.   Right Ear: External ear normal.   Left Ear: External ear normal.   Nose: Nose normal.   Mouth/Throat: Oropharynx is clear and moist.   Eyes: Conjunctivae are normal. Right eye exhibits no discharge. Left eye exhibits no discharge.   Neck: Neck supple.   Cardiovascular: Normal rate, regular rhythm and normal heart sounds.  Exam reveals no gallop and no friction rub.    No murmur heard.  Pulmonary/Chest: Effort normal and breath sounds normal. No accessory muscle usage. No respiratory distress. She has no wheezes. She has no rales. She exhibits no tenderness.   Abdominal: Soft. Bowel sounds are normal. She exhibits no distension. There is no tenderness.   Musculoskeletal: She exhibits no edema or deformity.   Neurological: She is alert and oriented to person, place, and time.   Skin: Skin is warm and dry. No rash noted. She is not diaphoretic. No erythema. No pallor.   Psychiatric: She has a normal mood and affect. Her behavior is normal.              Assessment/Plan:     Swapna was seen today for establish care, urinary tract infection, hypokalemia and constipation.    Diagnoses and all orders for this " visit:    Establishing care with new doctor, encounter for    Wears seat belt  Shahid # [id number]      Follow-up:     No Follow-up on file.      I discussed with the patient the diagnoses and orders from today's visit. All complaints addressed and all questions answered. Pt agrees with plan. Pt was told to call the clinic with any questions and to go the ER with any emergent problems. Pt understood and agreed.      GOALS:  Preventative Health Care to maintain current excellent health.    Preventative:  Female Preventative: PAP is due, to be done in 4 weeks by Alda  Vaccines:   Tetanus vaccine: up to date  Annual influenza vaccine: will address during influenza season   Pneumococcal vaccine: unknown  Hep B vaccine: up to date   Zoster vaccine:unknown    does not smoke  does not drink  eat more fruits and vegetables, increase physical activity, family to eat at dinner table more often, eat breakfast and have 3 meals a day    RISK SCORE: 1         This document has been electronically signed by Haylee Thomas MD on June 12, 2017 12:32 PM

## 2017-06-08 NOTE — PROGRESS NOTES
I have reviewed the notes, assessments, and/or procedures performed by Haylee Thomas MD , I concur with her/his documentation and assessment and plan for Swapna Vidales.        This document has been electronically signed by Nohelia High MD on June 8, 2017 3:38 PM

## 2017-06-09 NOTE — PROGRESS NOTES
"Subjective   Swapna Vidales is a 21 y.o. female who presents for a postpartum visit. She is 2 weeks postpartum following a spontaneous vaginal delivery. I have fully reviewed the prenatal and intrapartum course. The delivery was at 40 4/7 gestational weeks. Outcome: spontaneous vaginal delivery. Anesthesia: none. Postpartum course has been unremarkable. Baby's course has been unremarkable. Baby is feeding by both breast and bottle. Bleeding thin lochia. Bowel function is normal. Bladder function is normal. Patient is not sexually active. Contraception method is abstinence. Postpartum depression screening: negative. EDPS 0 out of 30.    The following portions of the patient's history were reviewed and updated as appropriate: allergies, current medications, past family history, past medical history, past social history, past surgical history and problem list.    Review of Systems  Constitutional: negative for chills and fevers  Respiratory: negative for dyspnea on exertion  Cardiovascular: negative for chest pain  Genitourinary:negative for dysuria  Behavioral/Psych: negative for anxiety and depression    Objective   /68  Ht 63\" (160 cm)  Wt 162 lb (73.5 kg)  Breastfeeding? No  BMI 28.7 kg/m2   General:  alert, appears stated age and cooperative   Lungs: clear to auscultation bilaterally   Heart:  regular rate and rhythm, S1, S2 normal, no murmur, click, rub or gallop     Assessment/Plan   Swapna was seen today for postpartum care.    Diagnoses and all orders for this visit:    Routine postpartum follow-up      2 weeks postpartum exam. Pap smear not done at today's visit. Needs PAP next visit.    1. Contraception: oral progesterone-only contraceptive  2. May increase activity as tolerated while maintaining pelvic rest.  3. Follow up in: 4 weeks or as needed.           "

## 2017-07-18 ENCOUNTER — POSTPARTUM VISIT (OUTPATIENT)
Dept: OBSTETRICS AND GYNECOLOGY | Facility: CLINIC | Age: 22
End: 2017-07-18

## 2017-07-18 VITALS
HEIGHT: 63 IN | BODY MASS INDEX: 28.17 KG/M2 | SYSTOLIC BLOOD PRESSURE: 118 MMHG | WEIGHT: 159 LBS | DIASTOLIC BLOOD PRESSURE: 82 MMHG

## 2017-07-18 DIAGNOSIS — Z12.4 ENCOUNTER FOR PAPANICOLAOU SMEAR OF CERVIX: ICD-10-CM

## 2017-07-18 LAB
CANDIDA ALBICANS: NEGATIVE
GARDNERELLA VAGINALIS: POSITIVE
TRICHOMONAS VAGINALIS PCR: NEGATIVE

## 2017-07-18 PROCEDURE — 87591 N.GONORRHOEAE DNA AMP PROB: CPT | Performed by: ADVANCED PRACTICE MIDWIFE

## 2017-07-18 PROCEDURE — 87510 GARDNER VAG DNA DIR PROBE: CPT | Performed by: ADVANCED PRACTICE MIDWIFE

## 2017-07-18 PROCEDURE — 87660 TRICHOMONAS VAGIN DIR PROBE: CPT | Performed by: ADVANCED PRACTICE MIDWIFE

## 2017-07-18 PROCEDURE — 0503F POSTPARTUM CARE VISIT: CPT | Performed by: ADVANCED PRACTICE MIDWIFE

## 2017-07-18 PROCEDURE — 88142 CYTOPATH C/V THIN LAYER: CPT | Performed by: ADVANCED PRACTICE MIDWIFE

## 2017-07-18 PROCEDURE — 87491 CHLMYD TRACH DNA AMP PROBE: CPT | Performed by: ADVANCED PRACTICE MIDWIFE

## 2017-07-18 PROCEDURE — 87480 CANDIDA DNA DIR PROBE: CPT | Performed by: ADVANCED PRACTICE MIDWIFE

## 2017-07-18 RX ORDER — IBUPROFEN 800 MG/1
800 TABLET ORAL EVERY 8 HOURS PRN
Qty: 9 TABLET | Refills: 0 | Status: SHIPPED | OUTPATIENT
Start: 2017-07-18 | End: 2018-07-20

## 2017-07-18 RX ORDER — CITALOPRAM 20 MG/1
20 TABLET ORAL DAILY
Qty: 30 TABLET | Refills: 0 | Status: SHIPPED | OUTPATIENT
Start: 2017-07-18 | End: 2018-03-19

## 2017-07-19 LAB
C TRACH RRNA CVX QL NAA+PROBE: NOT DETECTED
N GONORRHOEA RRNA SPEC QL NAA+PROBE: NOT DETECTED

## 2017-07-19 RX ORDER — METRONIDAZOLE 500 MG/1
500 TABLET ORAL 2 TIMES DAILY
Qty: 14 TABLET | Refills: 0 | Status: SHIPPED | OUTPATIENT
Start: 2017-07-19 | End: 2017-07-26

## 2017-07-20 NOTE — PROGRESS NOTES
"Subjective   Swapna Vidales is a 21 y.o. female who presents for a postpartum visit. She is 6 weeks postpartum following a spontaneous vaginal delivery. I have fully reviewed the prenatal and intrapartum course. The delivery was at 40 4/7 gestational weeks. Outcome: spontaneous vaginal delivery. Anesthesia: none. Postpartum course has been remarkable for feeling down, depressed, hopeless. Having crying spells. Denies thoughts of harm. Baby's course has been unremarkable. Baby is feeding by bottle - [place] [employer]. Bleeding no bleeding and had a period 7/5/17. Bowel function is normal. Bladder function is normal. Patient is not sexually active. Contraception method is abstinence.     The following portions of the patient's history were reviewed and updated as appropriate: allergies, current medications, past family history, past medical history, past social history, past surgical history and problem list.    Review of Systems  Constitutional: negative  Respiratory: negative  Cardiovascular: negative  Gastrointestinal: negative  Genitourinary:negative  Behavioral/Psych: positive for anxiety and depression    Objective   /82  Ht 63\" (160 cm)  Wt 159 lb (72.1 kg)  LMP 07/05/2017  Breastfeeding? No  BMI 28.17 kg/m2   General:  alert, appears stated age and cooperative   Lungs: clear to auscultation bilaterally   Heart:  regular rate and rhythm, S1, S2 normal, no murmur, click, rub or gallop     Assessment/Plan   Swapna was seen today for postpartum care.    Diagnoses and all orders for this visit:    Routine postpartum follow-up    Encounter for Papanicolaou smear of cervix  -     Chlamydia trachomatis, Neisseria gonorrhoeae, PCR  -     Gardnerella vaginalis, Trichomonas vaginalis, Candida albicans, PCR  -     Liquid-based Pap Smear, Screening    Postpartum depression    Other orders  -     citalopram (CELEXA) 20 MG tablet; Take 1 tablet by mouth Daily.  -     ibuprofen (ADVIL,MOTRIN) 800 MG " tablet; Take 1 tablet by mouth Every 8 (Eight) Hours As Needed for Mild Pain (1-3).      6 weeks postpartum exam. Pap smear done at today's visit.    1. Contraception: IUD (Paraguard). Will place with next period.   2. May increase activity as tolerated. Educated on taking antidepressant. Sent Celexa to pharmacy.  3. Follow up in: 2 weeks or as needed.

## 2017-07-25 LAB
LAB AP CASE REPORT: NORMAL
LAB AP GYN ADDITIONAL INFORMATION: NORMAL
LAB AP GYN OTHER FINDINGS: NORMAL
Lab: NORMAL
PATH INTERP SPEC-IMP: NORMAL
STAT OF ADQ CVX/VAG CYTO-IMP: NORMAL

## 2017-08-08 ENCOUNTER — PROCEDURE VISIT (OUTPATIENT)
Dept: OBSTETRICS AND GYNECOLOGY | Facility: CLINIC | Age: 22
End: 2017-08-08

## 2017-08-08 VITALS
SYSTOLIC BLOOD PRESSURE: 112 MMHG | BODY MASS INDEX: 28.53 KG/M2 | HEIGHT: 63 IN | DIASTOLIC BLOOD PRESSURE: 70 MMHG | WEIGHT: 161 LBS

## 2017-08-08 DIAGNOSIS — Z30.430 ENCOUNTER FOR IUD INSERTION: Primary | ICD-10-CM

## 2017-08-08 PROCEDURE — 58300 INSERT INTRAUTERINE DEVICE: CPT | Performed by: NURSE PRACTITIONER

## 2017-08-08 NOTE — PROGRESS NOTES
IUD Insertion      Patient's last menstrual period was 08/06/2016.    Date of procedure:  8/8/2017    Risks and benefits discussed? yes  All questions answered? yes  Consents given by the patient  Written consent obtained? yes    Local anesthesia used:  no  NDC number: 48099-275-44    Procedure documentation:    The anterior lip of the cervix was grasped with a tenaculum and the uterine cavity was gently sounded.  There was no difficulty passing the sound through the cervix.  Cervical dilation did not need to be performed prior to pacing the IUD.  The uterus was midposition and sounded to 8 cms.  The Mirena was then prepared per the manufacturers instructions.    The Mirena was advanced to a point 2 cms from the fundus and then the arms were released from the sheath.  The device was advanced to the fundus and the device was released fully from the sheath.. The string was cut 2 cms in length.  Bleeding from the cervix was scant.    She tolerated the procedure without any difficulty.     Post procedure instructions: Call if any fever or excessive bleeding or pain.    Follow up needed:         4 weeks for string check    Assessment/Plan:  Swapna was seen today for procedure.    Diagnoses and all orders for this visit:    Encounter for IUD insertion      This note was electronically signed.    Chema Brumfield, DANIEL

## 2017-10-24 ENCOUNTER — OFFICE VISIT (OUTPATIENT)
Dept: OBSTETRICS AND GYNECOLOGY | Facility: CLINIC | Age: 22
End: 2017-10-24

## 2017-10-24 VITALS
HEIGHT: 63 IN | SYSTOLIC BLOOD PRESSURE: 108 MMHG | BODY MASS INDEX: 29.06 KG/M2 | WEIGHT: 164 LBS | DIASTOLIC BLOOD PRESSURE: 68 MMHG

## 2017-10-24 DIAGNOSIS — Z30.431 IUD CHECK UP: Primary | ICD-10-CM

## 2017-10-24 DIAGNOSIS — N89.8 VAGINAL DISCHARGE: ICD-10-CM

## 2017-10-24 LAB
CANDIDA ALBICANS: NEGATIVE
GARDNERELLA VAGINALIS: POSITIVE
TRICHOMONAS VAGINALIS PCR: NEGATIVE

## 2017-10-24 PROCEDURE — 87510 GARDNER VAG DNA DIR PROBE: CPT | Performed by: NURSE PRACTITIONER

## 2017-10-24 PROCEDURE — 87660 TRICHOMONAS VAGIN DIR PROBE: CPT | Performed by: NURSE PRACTITIONER

## 2017-10-24 PROCEDURE — 87480 CANDIDA DNA DIR PROBE: CPT | Performed by: NURSE PRACTITIONER

## 2017-10-24 PROCEDURE — 99213 OFFICE O/P EST LOW 20 MIN: CPT | Performed by: NURSE PRACTITIONER

## 2017-10-24 RX ORDER — METRONIDAZOLE 500 MG/1
500 TABLET ORAL 2 TIMES DAILY
Qty: 14 TABLET | Refills: 0 | Status: SHIPPED | OUTPATIENT
Start: 2017-10-24 | End: 2017-10-31

## 2017-10-24 NOTE — PROGRESS NOTES
"Subjective   History of Present Illness    Swapna Vidales is a 21 y.o. female who presents for IUD check. States the strings hurt her partner once. She does self-checks of strings at home. Had difficulty feeling strings recently. Denies abdominal pain, heavy bleeding. Having some vaginal discharge.       Current contraception: Mirena IUD  Sexually active?: Yes  Postmenopausal?: No  HRT?: no  Hysterectomy?: no    Date last pap: 2017  History of abnormal Pap smear: no     /68  Ht 63\" (160 cm)  Wt 164 lb (74.4 kg)  LMP 2017 (Approximate)  Breastfeeding? No  BMI 29.05 kg/m2    Past Medical History:   Diagnosis Date   • Abdominal pain    • Acute maxillary sinusitis    • Acute pharyngitis    • Acute sinusitis    • Acute tonsillitis    • Acute upper respiratory infection    • Allergic rhinitis    • Bacterial vaginosis    • Bronchitis    • Conjunctivitis    • Cystitis    • Diarrhea    • Encounter for general counseling and advice on contraceptive management    • Encounter for  visit      visit status     • Encounters for administrative purposes     Exam, medical, administrative    • Gastroenteritis    • Genital herpes simplex    • Increased frequency of urination    • Influenza    • Irregular menses    • Irregular menstruation     unspecified     • Nausea and vomiting    • Otitis media    • Patient currently pregnant    • Pelvic pain     C/O pelvic pain    • Respiratory syncytial virus infection     three months of age     • Routine postpartum follow-up    • Streptococcal sore throat    • Upper respiratory infection        Past Surgical History:   Procedure Laterality Date   • VAGINAL DELIVERY  2013    Spontaneous vaginal delivery at term.       The following portions of the patient's history were reviewed and updated as appropriate: allergies, current medications, past family history, past medical history, past social history, past surgical history and problem " list.    Review of Systems    Constitutional:  No fatigue, No weight loss, No weight gain, No fever and No chills   Respiratory: No dyspnea, No cough, No hemopytysis, No wheezing and No pleuritic pain   Cardiovascular: No chest pain, No palpitations, No arrhythmia, No orthopnea, No noctural dyspnea, No edema and No claudication   Breasts: No discharge from nipple, No breast tenderness and No breast mass   Gastrointestinal: No loss of appetite, No dysphagia, No abdominal pain, No nausea, No vomiting, No change in bowel habits, No diarrhea, No constipation and No blood in stool   Genitourinary: No increased frequency of urination, No dysuria, No hematuria, No nocturia, No urinary incontinence, No vaginal discharge, No abnormal vaginal bleeding, No pelvic pain, No menstrual problem and No menopausal problem   Skin: No skin rash, No skin lesion, No dry skin, No pruritus and No nail problem   Neurologic: No headache, No dizziness, No lightheadedness, No syncope, No vertigo, No weakness, No numbness, No tremor and No paresthesia   Psychiatric: No difficulty sleeping, No mood swings, No feeling anxious, No confusion and No memory loss          Objective   Physical Exam    General:  Alert and oriented x 3, Cooperative, Well developed & well nourished and No acute distress   Genitourinary: Vulva normal, vaginal mucosa normal, bladder nondistended and nontender, cervix normal, uterus normal size and nontender, no adnexal/pelvic tenderness or masses, Bartholin's/Euharlee/Urethral gland WNL, IUD strings noted located within the cervical os, used cytobrush to visualized strings   Skin: Skin warm and dry and Pattern of hair growth normal       Assessment/Plan   Swapna was seen today for follow-up.    Diagnoses and all orders for this visit:    IUD check up    Vaginal discharge  -     Gardnerella vaginalis, Trichomonas vaginalis, Candida albicans, PCR - Swab, Vagina      All questions answered.  Will rule out vaginal  infections.  Discussed contraception methods, including risks/side effects/benefits.  Follow up in 1 year.

## 2017-10-27 ENCOUNTER — TELEPHONE (OUTPATIENT)
Dept: OBSTETRICS AND GYNECOLOGY | Facility: CLINIC | Age: 22
End: 2017-10-27

## 2017-10-27 NOTE — TELEPHONE ENCOUNTER
----- Message from Lisa Cortes sent at 10/26/2017  3:55 PM CDT -----  Contact: [phone]  PATIENT IS WANTING TO GET HER LAB RESULTS     Called and informed the pt of +BV and that rx has been sent to her pharmacy.

## 2017-11-30 PROCEDURE — 83690 ASSAY OF LIPASE: CPT | Performed by: PHYSICIAN ASSISTANT

## 2018-04-23 ENCOUNTER — OFFICE VISIT (OUTPATIENT)
Dept: FAMILY MEDICINE CLINIC | Facility: CLINIC | Age: 23
End: 2018-04-23

## 2018-04-23 VITALS
DIASTOLIC BLOOD PRESSURE: 62 MMHG | HEIGHT: 62 IN | OXYGEN SATURATION: 99 % | SYSTOLIC BLOOD PRESSURE: 124 MMHG | WEIGHT: 151.6 LBS | BODY MASS INDEX: 27.9 KG/M2 | TEMPERATURE: 98 F | HEART RATE: 79 BPM

## 2018-04-23 DIAGNOSIS — R19.8 POSITIVE MURPHY'S SIGN: ICD-10-CM

## 2018-04-23 DIAGNOSIS — R10.11 RUQ PAIN: Primary | ICD-10-CM

## 2018-04-23 DIAGNOSIS — R10.829 REBOUND TENDERNESS: ICD-10-CM

## 2018-04-23 PROCEDURE — 99214 OFFICE O/P EST MOD 30 MIN: CPT | Performed by: NURSE PRACTITIONER

## 2018-04-23 RX ORDER — PROMETHAZINE HYDROCHLORIDE 25 MG/1
25 TABLET ORAL EVERY 6 HOURS PRN
Qty: 30 TABLET | Refills: 0 | Status: SHIPPED | OUTPATIENT
Start: 2018-04-23 | End: 2018-09-10

## 2018-04-23 RX ORDER — DICYCLOMINE HCL 20 MG
20 TABLET ORAL EVERY 6 HOURS PRN
Qty: 30 TABLET | Refills: 0 | Status: SHIPPED | OUTPATIENT
Start: 2018-04-23 | End: 2019-02-27

## 2018-04-23 NOTE — PROGRESS NOTES
Subjective   Swapna Vidales is a 22 y.o. female who presents to the office complaining of RUQ pain that started several days ago.  Notices more so after she eats and is nauseous.    Abdominal Pain   This is a recurrent problem. The current episode started more than 1 month ago. The onset quality is sudden. The problem occurs daily. The most recent episode lasted 4 days. The problem has been gradually worsening. The pain is located in the epigastric region. The pain is at a severity of 6/10. The quality of the pain is aching, cramping, a sensation of fullness and sharp. The abdominal pain radiates to the right shoulder, chest and back. Associated symptoms include anorexia, belching, constipation, nausea, vomiting and weight loss. Pertinent negatives include no arthralgias, dysuria, fever, flatus, frequency, headaches, hematochezia, hematuria, melena or myalgias. The pain is aggravated by deep breathing and eating. The pain is relieved by nothing.        The following portions of the patient's history were reviewed and updated as appropriate: allergies, current medications, past family history, past medical history, past social history, past surgical history and problem list.    Review of Systems   Constitutional: Positive for weight loss. Negative for chills, fatigue and fever.   HENT: Negative for congestion, sneezing, sore throat and trouble swallowing.    Eyes: Negative for visual disturbance.   Respiratory: Negative for cough, chest tightness, shortness of breath and wheezing.    Cardiovascular: Negative for chest pain, palpitations and leg swelling.   Gastrointestinal: Positive for abdominal pain, anorexia, constipation, nausea and vomiting. Negative for flatus, hematochezia and melena.   Genitourinary: Negative for dysuria, frequency, hematuria and urgency.   Musculoskeletal: Negative for arthralgias, myalgias and neck pain.   Skin: Negative for rash.   Neurological: Negative for dizziness, weakness  and headaches.   Psychiatric/Behavioral:        In the past two weeks the pt has not felt down, depressed, hopeless or lost interest in doing things   All other systems reviewed and are negative.      Objective   Physical Exam   Constitutional: She is oriented to person, place, and time. She appears well-developed and well-nourished. She is cooperative.  Non-toxic appearance. She does not have a sickly appearance. She appears distressed.   HENT:   Head: Normocephalic and atraumatic.   Right Ear: External ear normal.   Left Ear: External ear normal.   Nose: Nose normal.   Mouth/Throat: Oropharynx is clear and moist.   Eyes: Conjunctivae and EOM are normal. Pupils are equal, round, and reactive to light. Right eye exhibits no discharge. Left eye exhibits no discharge. No scleral icterus.   Neck: Normal range of motion. Neck supple. No thyromegaly present.   Cardiovascular: Normal rate, regular rhythm, normal heart sounds and intact distal pulses.  Exam reveals no gallop and no friction rub.    No murmur heard.  Pulmonary/Chest: Effort normal and breath sounds normal. No respiratory distress. She has no wheezes. She has no rales.   Abdominal: Soft. Normal appearance and bowel sounds are normal. She exhibits no distension. There is tenderness in the right upper quadrant. There is rebound and positive Fisher's sign. There is no guarding.   Musculoskeletal: Normal range of motion. She exhibits no edema.   Lymphadenopathy:     She has no cervical adenopathy.   Neurological: She is alert and oriented to person, place, and time. No cranial nerve deficit. GCS eye subscore is 4. GCS verbal subscore is 5. GCS motor subscore is 6.   Skin: Skin is warm and dry. No rash noted.   Psychiatric: She has a normal mood and affect. Her behavior is normal. Judgment and thought content normal.   Nursing note and vitals reviewed.      Assessment/Plan   Swapna was seen today for pain.    Diagnoses and all orders for this visit:    RUQ  pain  -     NM HIDA Scan With Pharmacological Intervention    Rebound tenderness  -     NM HIDA Scan With Pharmacological Intervention    Positive Fisher's Sign  -     NM HIDA Scan With Pharmacological Intervention    Other orders  -     dicyclomine (BENTYL) 20 MG tablet; Take 1 tablet by mouth Every 6 (Six) Hours As Needed (Abdominal Cramping).  -     promethazine (PHENERGAN) 25 MG tablet; Take 1 tablet by mouth Every 6 (Six) Hours As Needed for Nausea or Vomiting.    Encouraged to use Bentyl, maintain adequate fluid intake.  Limit greasy foods.

## 2018-04-25 ENCOUNTER — HOSPITAL ENCOUNTER (OUTPATIENT)
Dept: NUCLEAR MEDICINE | Facility: HOSPITAL | Age: 23
Discharge: HOME | End: 2018-04-25

## 2018-04-25 PROCEDURE — 78227 HEPATOBIL SYST IMAGE W/DRUG: CPT

## 2018-04-25 PROCEDURE — 0 TECHNETIUM TC 99M MEBROFENIN KIT: Performed by: NURSE PRACTITIONER

## 2018-04-25 PROCEDURE — A9537 TC99M MEBROFENIN: HCPCS | Performed by: NURSE PRACTITIONER

## 2018-04-25 PROCEDURE — 25010000002 SINCALIDE PER 5 MCG: Performed by: NURSE PRACTITIONER

## 2018-04-25 RX ORDER — SODIUM CHLORIDE 9 MG/ML
50 INJECTION, SOLUTION INTRAVENOUS ONCE
Status: COMPLETED | OUTPATIENT
Start: 2018-04-25 | End: 2018-04-25

## 2018-04-25 RX ORDER — KIT FOR THE PREPARATION OF TECHNETIUM TC 99M MEBROFENIN 45 MG/10ML
1 INJECTION, POWDER, LYOPHILIZED, FOR SOLUTION INTRAVENOUS
Status: COMPLETED | OUTPATIENT
Start: 2018-04-25 | End: 2018-04-25

## 2018-04-25 RX ADMIN — MEBROFENIN 1 DOSE: 45 INJECTION, POWDER, LYOPHILIZED, FOR SOLUTION INTRAVENOUS at 10:11

## 2018-04-25 RX ADMIN — SODIUM CHLORIDE 50 ML: 9 INJECTION, SOLUTION INTRAVENOUS at 11:18

## 2018-04-25 RX ADMIN — SINCALIDE 1.4 MCG: 5 INJECTION, POWDER, LYOPHILIZED, FOR SOLUTION INTRAVENOUS at 11:18

## 2018-04-26 ENCOUNTER — TELEPHONE (OUTPATIENT)
Dept: FAMILY MEDICINE CLINIC | Facility: CLINIC | Age: 23
End: 2018-04-26

## 2018-04-26 NOTE — TELEPHONE ENCOUNTER
----- Message from DANIEL Woodward sent at 4/25/2018 11:37 PM CDT -----  Pls inform her HIDA scan was normal, if pain continues may refer to ANGELA Almanzar.

## 2018-05-01 ENCOUNTER — TELEPHONE (OUTPATIENT)
Dept: FAMILY MEDICINE CLINIC | Facility: CLINIC | Age: 23
End: 2018-05-01

## 2018-05-03 ENCOUNTER — TELEPHONE (OUTPATIENT)
Dept: FAMILY MEDICINE CLINIC | Facility: CLINIC | Age: 23
End: 2018-05-03

## 2018-07-20 ENCOUNTER — APPOINTMENT (OUTPATIENT)
Dept: LAB | Facility: HOSPITAL | Age: 23
End: 2018-07-20

## 2018-07-20 PROCEDURE — 82150 ASSAY OF AMYLASE: CPT | Performed by: NURSE PRACTITIONER

## 2018-07-20 PROCEDURE — 83690 ASSAY OF LIPASE: CPT | Performed by: NURSE PRACTITIONER

## 2018-07-20 PROCEDURE — 80053 COMPREHEN METABOLIC PANEL: CPT | Performed by: NURSE PRACTITIONER

## 2018-07-20 PROCEDURE — 85025 COMPLETE CBC W/AUTO DIFF WBC: CPT | Performed by: NURSE PRACTITIONER

## 2018-07-27 ENCOUNTER — APPOINTMENT (OUTPATIENT)
Dept: GENERAL RADIOLOGY | Facility: HOSPITAL | Age: 23
End: 2018-07-27

## 2018-07-27 ENCOUNTER — HOSPITAL ENCOUNTER (EMERGENCY)
Facility: HOSPITAL | Age: 23
Discharge: HOME | End: 2018-07-27
Attending: FAMILY MEDICINE | Admitting: FAMILY MEDICINE

## 2018-07-27 VITALS
SYSTOLIC BLOOD PRESSURE: 136 MMHG | BODY MASS INDEX: 23.21 KG/M2 | HEART RATE: 75 BPM | RESPIRATION RATE: 18 BRPM | DIASTOLIC BLOOD PRESSURE: 84 MMHG | OXYGEN SATURATION: 100 % | WEIGHT: 131 LBS | TEMPERATURE: 98.2 F | HEIGHT: 63 IN

## 2018-07-27 DIAGNOSIS — R07.89 RIGHT-SIDED CHEST WALL PAIN: Primary | ICD-10-CM

## 2018-07-27 LAB — B-HCG UR QL: NEGATIVE

## 2018-07-27 PROCEDURE — 99283 EMERGENCY DEPT VISIT LOW MDM: CPT

## 2018-07-27 PROCEDURE — 71101 X-RAY EXAM UNILAT RIBS/CHEST: CPT

## 2018-07-27 PROCEDURE — 81025 URINE PREGNANCY TEST: CPT | Performed by: FAMILY MEDICINE

## 2018-07-27 RX ORDER — NAPROXEN 500 MG/1
500 TABLET ORAL 2 TIMES DAILY WITH MEALS
Qty: 20 TABLET | Refills: 0 | Status: SHIPPED | OUTPATIENT
Start: 2018-07-27 | End: 2018-09-10

## 2018-07-30 ENCOUNTER — TELEPHONE (OUTPATIENT)
Dept: FAMILY MEDICINE CLINIC | Facility: CLINIC | Age: 23
End: 2018-07-30

## 2018-08-01 ENCOUNTER — TELEPHONE (OUTPATIENT)
Dept: FAMILY MEDICINE CLINIC | Facility: CLINIC | Age: 23
End: 2018-08-01

## 2018-08-01 ENCOUNTER — OFFICE VISIT (OUTPATIENT)
Dept: FAMILY MEDICINE CLINIC | Facility: CLINIC | Age: 23
End: 2018-08-01

## 2018-08-01 VITALS
OXYGEN SATURATION: 98 % | BODY MASS INDEX: 23.28 KG/M2 | HEIGHT: 63 IN | HEART RATE: 74 BPM | SYSTOLIC BLOOD PRESSURE: 124 MMHG | DIASTOLIC BLOOD PRESSURE: 70 MMHG | WEIGHT: 131.4 LBS

## 2018-08-01 DIAGNOSIS — R63.4 WEIGHT LOSS, UNINTENTIONAL: ICD-10-CM

## 2018-08-01 DIAGNOSIS — R10.11 RIGHT UPPER QUADRANT ABDOMINAL PAIN: ICD-10-CM

## 2018-08-01 DIAGNOSIS — M25.571 ACUTE RIGHT ANKLE PAIN: Primary | ICD-10-CM

## 2018-08-01 PROCEDURE — 99214 OFFICE O/P EST MOD 30 MIN: CPT | Performed by: FAMILY MEDICINE

## 2018-08-01 NOTE — PROGRESS NOTES
Per Dr. Francisco, Ms. Vidales has been called with her recent X-ray results & recommendations.  Continue her current medications and follow-up as planned or sooner if any problems.

## 2018-08-01 NOTE — TELEPHONE ENCOUNTER
Per Dr. Francisco, Ms. Vidales has been called with her recent X-ray results & recommendations.  Continue her current medications and follow-up as planned or sooner if any problems.      ----- Message from Sony Francisco MD sent at 8/1/2018  5:19 PM CDT -----  No fractures noted.  Small spur was noted incidentally

## 2018-08-01 NOTE — PROGRESS NOTES
Subjective   Swapna Vidales is a 22 y.o. female.     Abdominal Pain   This is a new problem. The current episode started more than 1 year ago. The onset quality is sudden. The problem occurs intermittently. The most recent episode lasted 1 week. The problem has been unchanged. The pain is located in the RUQ. The pain is at a severity of 9/10. The pain is severe. The quality of the pain is sharp, aching and a sensation of fullness. The abdominal pain radiates to the right shoulder and left shoulder. Associated symptoms include constipation and weight loss. Pertinent negatives include no anorexia, arthralgias, belching, diarrhea, dysuria, fever, flatus, hematochezia, hematuria, nausea or vomiting. Nothing aggravates the pain. The pain is relieved by nothing. She has tried nothing for the symptoms.   Ankle Pain    The incident occurred 3 to 5 days ago. The incident occurred in the yard. The injury mechanism was an inversion injury. The pain is present in the right ankle. The quality of the pain is described as aching. The pain is at a severity of 9/10. The pain is severe. The pain has been intermittent since onset. Pertinent negatives include no inability to bear weight, loss of motion, muscle weakness, numbness or tingling. She reports no foreign bodies present. The symptoms are aggravated by movement and weight bearing. She has tried NSAIDs for the symptoms. The treatment provided no relief.   Weight Loss   This is a new problem. The current episode started more than 1 year ago. The problem occurs constantly. The problem has been unchanged. Associated symptoms include abdominal pain. Pertinent negatives include no anorexia, arthralgias, change in bowel habit, fever, nausea, numbness, rash or vomiting.        The following portions of the patient's history were reviewed and updated as appropriate: allergies, current medications, past family history, past medical history, past social history, past surgical  history and problem list.    Review of Systems   Constitutional: Positive for weight loss. Negative for fever.   Gastrointestinal: Positive for abdominal pain and constipation. Negative for anorexia, change in bowel habit, diarrhea, flatus, hematochezia, nausea and vomiting.   Genitourinary: Negative for dysuria and hematuria.   Musculoskeletal: Negative for arthralgias.   Skin: Negative for rash.   Neurological: Negative for tingling and numbness.       Objective   Physical Exam   Constitutional: She is oriented to person, place, and time. She appears well-developed and well-nourished. No distress.   HENT:   Head: Normocephalic and atraumatic.   Cardiovascular: Normal rate, regular rhythm and normal heart sounds.  Exam reveals no gallop and no friction rub.    No murmur heard.  Pulmonary/Chest: Effort normal and breath sounds normal. No respiratory distress. She has no wheezes. She has no rales. She exhibits no tenderness.   Abdominal: Soft. Normal appearance. She exhibits ascites. She exhibits no mass. There is no hepatosplenomegaly. There is no tenderness.   Musculoskeletal: She exhibits no edema.   Lymphadenopathy:        Head (right side): No submental and no submandibular adenopathy present.        Head (left side): No submental and no submandibular adenopathy present.     She has no cervical adenopathy.   Neurological: She is alert and oriented to person, place, and time.   Skin: Skin is warm and dry. She is not diaphoretic.   Psychiatric: She has a normal mood and affect. Her speech is normal and behavior is normal. Judgment and thought content normal.   Nursing note and vitals reviewed.      Assessment/Plan   Problems Addressed this Visit     None      Visit Diagnoses     Acute right ankle pain    -  Primary    Relevant Orders    XR Ankle 3+ View Right (In Office) (Completed)    Right upper quadrant abdominal pain        Relevant Orders    HIV-1/O/2 Ag/Ab w Reflex    Sedimentation Rate    C-reactive protein     Hepatitis panel, acute    Ambulatory Referral to Gastroenterology    Weight loss, unintentional            Current outpatient and discharge medications have been reconciled for the patient.  Reviewed by: Sony Francisco MD     labs reviewed

## 2018-08-14 ENCOUNTER — OFFICE VISIT (OUTPATIENT)
Dept: GASTROENTEROLOGY | Facility: CLINIC | Age: 23
End: 2018-08-14

## 2018-08-14 VITALS
HEIGHT: 63 IN | DIASTOLIC BLOOD PRESSURE: 64 MMHG | HEART RATE: 74 BPM | BODY MASS INDEX: 23.39 KG/M2 | WEIGHT: 132 LBS | SYSTOLIC BLOOD PRESSURE: 98 MMHG

## 2018-08-14 DIAGNOSIS — R19.4 CHANGE IN BOWEL HABITS: ICD-10-CM

## 2018-08-14 DIAGNOSIS — K59.04 CHRONIC IDIOPATHIC CONSTIPATION: ICD-10-CM

## 2018-08-14 DIAGNOSIS — R10.84 GENERALIZED ABDOMINAL PAIN: Primary | ICD-10-CM

## 2018-08-14 PROCEDURE — 99203 OFFICE O/P NEW LOW 30 MIN: CPT | Performed by: INTERNAL MEDICINE

## 2018-08-14 RX ORDER — SODIUM, POTASSIUM,MAG SULFATES 17.5-3.13G
1 SOLUTION, RECONSTITUTED, ORAL ORAL EVERY 12 HOURS
Qty: 1 BOTTLE | Refills: 0 | Status: ON HOLD | OUTPATIENT
Start: 2018-08-14 | End: 2018-08-24

## 2018-08-14 RX ORDER — DEXTROSE AND SODIUM CHLORIDE 5; .45 G/100ML; G/100ML
30 INJECTION, SOLUTION INTRAVENOUS CONTINUOUS PRN
Status: CANCELLED | OUTPATIENT
Start: 2018-08-17

## 2018-08-14 NOTE — PROGRESS NOTES
[facility]      Chief Complaint:   Chief Complaint   Patient presents with   • Right upper quadrant pain       Subjective     HPI:   Patient with right upper quadrant abdominal pain severe nature.  Patient also with constipation and changes in bowel habits with constipation.  Patient states pain is worse when she is constipated.  Patient also with epigastric abdominal pain since having an IUD placed.  Patient states the pain developed during her pregnancy but has worsened since then.  Patient had a CT scan of the abdomen which was essentially normal also evaluation of the gallbladder which was also normal.    Plan; schedule patient for EGD and colonoscopy to evaluate for GI sources of this patient's abdominal pain.  Discussed with patient the importance of following up with OB/GYN and possibly having her IUD removed.  The patient follow up in 4 weeks after procedures.    Past Medical History:   Past Medical History:   Diagnosis Date   • Abdominal pain    • Acute maxillary sinusitis    • Acute pharyngitis    • Acute sinusitis    • Acute tonsillitis    • Acute upper respiratory infection    • Allergic rhinitis    • Bacterial vaginosis    • Bronchitis    • Conjunctivitis    • Cystitis    • Diarrhea    • Encounter for general counseling and advice on contraceptive management    • Encounter for  visit      visit status     • Encounters for administrative purposes     Exam, medical, administrative    • Gastroenteritis    • Genital herpes simplex    • Increased frequency of urination    • Influenza    • Irregular menses    • Irregular menstruation     unspecified     • Nausea and vomiting    • Otitis media    • Patient currently pregnant    • Pelvic pain     C/O pelvic pain    • Respiratory syncytial virus infection     three months of age     • Routine postpartum follow-up    • Streptococcal sore throat    • Upper respiratory infection        Family History:  Family History    Problem Relation Age of Onset   • Hyperlipidemia Father         High Cholesterol   • Heart block Father    • Diabetes Father    • Ovarian cysts Sister    • Thyroid disease Mother         hypothyroidism       Social History:   reports that she has never smoked. She has never used smokeless tobacco. She reports that she does not drink alcohol or use drugs.    Medications:   Prior to Admission medications    Medication Sig Start Date End Date Taking? Authorizing Provider   albuterol (PROVENTIL HFA;VENTOLIN HFA) 108 (90 Base) MCG/ACT inhaler Inhale 2 puffs Every 6 (Six) Hours As Needed for Wheezing or Shortness of Air. 5/15/18  Yes Promise Viramontes PA-C   cetirizine (zyrTEC) 10 MG tablet Take 1 tablet by mouth Daily. 5/15/18  Yes Promise Viramontes PA-C   dicyclomine (BENTYL) 20 MG tablet Take 1 tablet by mouth Every 6 (Six) Hours As Needed (Abdominal Cramping). 4/23/18  Yes Lyudmila Alvarez APRN   levonorgestrel (MIRENA) 20 MCG/24HR IUD 1 each by Intrauterine route 1 (One) Time.   Yes Provider, MD Moris   naproxen (EC NAPROSYN) 500 MG EC tablet Take 1 tablet by mouth 2 (Two) Times a Day With Meals. 7/27/18  Yes Johan Dias MD   promethazine (PHENERGAN) 25 MG tablet Take 1 tablet by mouth Every 6 (Six) Hours As Needed for Nausea or Vomiting. 4/23/18  Yes Lyudmila Alvarez APRN   sodium-potassium-magnesium sulfates (SUPREP) 17.5-3.13-1.6 GM/180ML solution oral solution Take 1 bottle by mouth Every 12 (Twelve) Hours. 8/14/18   Jimmie Reilly MD       Allergies:  Patient has no known allergies.    ROS:    Review of Systems   Constitutional: Negative for activity change, appetite change, chills, diaphoresis, fatigue, fever and unexpected weight change.   HENT: Negative for sore throat and trouble swallowing.    Respiratory: Negative for shortness of breath.    Gastrointestinal: Positive for abdominal pain and constipation. Negative for abdominal distention, anal bleeding, blood in stool, diarrhea, nausea,  "rectal pain and vomiting.   Musculoskeletal: Negative for arthralgias.   Skin: Negative for pallor.   Neurological: Negative for light-headedness.     Objective     Blood pressure 98/64, pulse 74, height 160 cm (63\"), weight 59.9 kg (132 lb), not currently breastfeeding.    Physical Exam   Constitutional: She is oriented to person, place, and time. She appears well-developed and well-nourished. No distress.   HENT:   Head: Normocephalic and atraumatic.   Cardiovascular: Normal rate, regular rhythm, normal heart sounds and intact distal pulses.  Exam reveals no gallop and no friction rub.    No murmur heard.  Pulmonary/Chest: Breath sounds normal. No respiratory distress. She has no wheezes. She has no rales. She exhibits no tenderness.   Abdominal: Soft. Bowel sounds are normal. She exhibits no distension and no mass. There is tenderness. There is no rebound and no guarding. No hernia.   Musculoskeletal: Normal range of motion. She exhibits no edema.   Neurological: She is alert and oriented to person, place, and time.   Skin: Skin is warm and dry. No rash noted. She is not diaphoretic. No erythema. No pallor.   Psychiatric: She has a normal mood and affect. Her behavior is normal. Judgment and thought content normal.        Assessment/Plan   Swapna was seen today for right upper quadrant pain.    Diagnoses and all orders for this visit:    Generalized abdominal pain  -     Case Request; Standing  -     dextrose 5 % and sodium chloride 0.45 % infusion; Infuse 30 mL/hr into a venous catheter Continuous As Needed (Start Prior to Procedure).  -     Case Request    Change in bowel habits  -     Case Request; Standing  -     dextrose 5 % and sodium chloride 0.45 % infusion; Infuse 30 mL/hr into a venous catheter Continuous As Needed (Start Prior to Procedure).  -     Case Request    Chronic idiopathic constipation  -     Case Request; Standing  -     dextrose 5 % and sodium chloride 0.45 % infusion; Infuse 30 mL/hr into " a venous catheter Continuous As Needed (Start Prior to Procedure).  -     Case Request    Other orders  -     Follow Anesthesia Guidelines / Standing Orders; Future  -     Implement Anesthesia Orders Day of Procedure; Standing  -     Obtain Informed Consent; Standing  -     sodium-potassium-magnesium sulfates (SUPREP) 17.5-3.13-1.6 GM/180ML solution oral solution; Take 1 bottle by mouth Every 12 (Twelve) Hours.        ESOPHAGOGASTRODUODENOSCOPY (N/A), COLONOSCOPY (N/A)     Diagnosis Plan   1. Generalized abdominal pain  Case Request    dextrose 5 % and sodium chloride 0.45 % infusion    Case Request   2. Change in bowel habits  Case Request    dextrose 5 % and sodium chloride 0.45 % infusion    Case Request   3. Chronic idiopathic constipation  Case Request    dextrose 5 % and sodium chloride 0.45 % infusion    Case Request       Anticipated Surgical Procedure:  Orders Placed This Encounter   Procedures   • Follow Anesthesia Guidelines / Standing Orders     Standing Status:   Future       The risks, benefits, and alternatives of this procedure have been discussed with the patient or the responsible party- the patient understands and agrees to proceed.

## 2018-08-14 NOTE — PATIENT INSTRUCTIONS
MyPlate from [employer]  The general, healthful diet is based on the 2010 Dietary Guidelines for Americans. The amount of food you need to eat from each food group depends on your age, sex, and level of physical activity and can be individualized by a dietitian. Go to ChooseMyPlate.gov for more information.  What do I need to know about the MyPlate plan?  · Enjoy your food, but eat less.  · Avoid oversized portions.  ? ½ of your plate should include fruits and vegetables.  ? ¼ of your plate should be grains.  ? ¼ of your plate should be protein.  Grains  · Make at least half of your grains whole grains.  · For a 2,000 calorie daily food plan, eat 6 oz every day.  · 1 oz is about 1 slice bread, 1 cup cereal, or ½ cup cooked rice, cereal, or pasta.  Vegetables  · Make half your plate fruits and vegetables.  · For a 2,000 calorie daily food plan, eat 2½ cups every day.  · 1 cup is about 1 cup raw or cooked vegetables or vegetable juice or 2 cups raw leafy greens.  Fruits  · Make half your plate fruits and vegetables.  · For a 2,000 calorie daily food plan, eat 2 cups every day.  · 1 cup is about 1 cup fruit or 100% fruit juice or ½ cup dried fruit.  Protein  · For a 2,000 calorie daily food plan, eat 5½ oz every day.  · 1 oz is about 1 oz meat, poultry, or fish, ¼ cup cooked beans, 1 egg, 1 Tbsp peanut butter, or ½ oz nuts or seeds.  Dairy  · Switch to fat-free or low-fat (1%) milk.  · For a 2,000 calorie daily food plan, eat 3 cups every day.  · 1 cup is about 1 cup milk or yogurt or soy milk (soy beverage), 1½ oz natural cheese, or 2 oz processed cheese.  Fats, Oils, and Empty Calories  · Only small amounts of oils are recommended.  · Empty calories are calories from solid fats or added sugars.  · Compare sodium in foods like soup, bread, and frozen meals. Choose the foods with lower numbers.  · Drink water instead of sugary drinks.  What foods can I eat?  Grains  Whole grains such as whole wheat, quinoa, millet, and  bulgur. Bread, rolls, and pasta made from whole grains. Brown or wild rice. Hot or cold cereals made from whole grains and without added sugar.  Vegetables  All fresh vegetables, especially fresh red, dark green, or orange vegetables. Peas and beans. Low-sodium frozen or canned vegetables prepared without added salt. Low-sodium vegetable juices.  Fruits  All fresh, frozen, and dried fruits. Canned fruit packed in water or fruit juice without added sugar. Fruit juices without added sugar.  Meats and Other Protein Sources  Boiled, baked, or grilled lean meat trimmed of fat. Skinless poultry. Fresh seafood and shellfish. Canned seafood packed in water. Unsalted nuts and unsalted nut butters. Tofu. Dried beans and pea. Eggs.  Dairy  Low-fat or fat-free milk, yogurt, and cheeses.  Sweets and Desserts  Frozen desserts made from low-fat milk.  Fats and Oils  Olive, peanut, and canola oils and margarine. Salad dressing and mayonnaise made from these oils.  Other  Soups and casseroles made from allowed ingredients and without added fat or salt.  The items listed above may not be a complete list of recommended foods or beverages. Contact your dietitian for more options.  What foods are not recommended?  Grains  Sweetened, low-fiber cereals. Packaged baked goods. Snack crackers and chips. Cheese crackers, butter crackers, and biscuits. Frozen waffles, sweet breads, doughnuts, pastries, packaged baking mixes, pancakes, cakes, and cookies.  Vegetables  Regular canned or frozen vegetables or vegetables prepared with salt. Canned tomatoes. Canned tomato sauce. Fried vegetables. Vegetables in cream sauce or cheese sauce.  Fruits  Fruits packed in syrup or made with added sugar.  Meats and Other Protein Sources  Marbled or fatty meats such as ribs. Poultry with skin. Fried meats, poultry, eggs, or fish. Sausages, hot dogs, and deli meats such as pastrami, bologna, or salami.  Dairy  Whole milk, cream, cheeses made from whole milk,  sour cream. Ice cream or yogurt made from whole milk or with added sugar.  Beverages  For adults, no more than one alcoholic drink per day. Regular soft drinks or other sugary beverages. Juice drinks.  Sweets and Desserts  Sugary or fatty desserts, candy, and other sweets.  Fats and Oils  Solid shortening or partially hydrogenated oils. Solid margarine. Margarine that contains trans fats. Butter.  The items listed above may not be a complete list of foods and beverages to avoid. Contact your dietitian for more information.  This information is not intended to replace advice given to you by your health care provider. Make sure you discuss any questions you have with your health care provider.  Document Released: 01/06/2009 Document Revised: 05/25/2017 Document Reviewed: 11/26/2014  Elsevier Interactive Patient Education © 2018 [employer]

## 2018-08-17 ENCOUNTER — TELEPHONE (OUTPATIENT)
Dept: GASTROENTEROLOGY | Facility: CLINIC | Age: 23
End: 2018-08-17

## 2018-08-17 NOTE — TELEPHONE ENCOUNTER
08/17/2018, 1209 - Patient telephoned per this staff member [phone] with notification sample Suprep Bowel Preparation Kit with instructions for utilization may be obtained per office of Dr. Jimmie Palumbo M.D.  Patient verbalized understanding.

## 2018-08-17 NOTE — TELEPHONE ENCOUNTER
----- Message from Carlie Gonzalez MA sent at 8/17/2018  9:27 AM CDT -----  Needs new prep Golytely made her throw up, needs approval for gatorade/miralax or sample of suprep

## 2018-08-24 ENCOUNTER — ANESTHESIA (OUTPATIENT)
Dept: GASTROENTEROLOGY | Facility: HOSPITAL | Age: 23
End: 2018-08-24

## 2018-08-24 ENCOUNTER — ANESTHESIA EVENT (OUTPATIENT)
Dept: GASTROENTEROLOGY | Facility: HOSPITAL | Age: 23
End: 2018-08-24

## 2018-08-24 ENCOUNTER — HOSPITAL ENCOUNTER (OUTPATIENT)
Facility: HOSPITAL | Age: 23
Setting detail: HOSPITAL OUTPATIENT SURGERY
Discharge: HOME | End: 2018-08-24
Attending: INTERNAL MEDICINE | Admitting: INTERNAL MEDICINE

## 2018-08-24 VITALS
SYSTOLIC BLOOD PRESSURE: 118 MMHG | OXYGEN SATURATION: 95 % | WEIGHT: 128 LBS | DIASTOLIC BLOOD PRESSURE: 63 MMHG | RESPIRATION RATE: 20 BRPM | HEART RATE: 88 BPM | HEIGHT: 63 IN | BODY MASS INDEX: 22.68 KG/M2 | TEMPERATURE: 97.2 F

## 2018-08-24 DIAGNOSIS — R10.84 GENERALIZED ABDOMINAL PAIN: ICD-10-CM

## 2018-08-24 DIAGNOSIS — R19.4 CHANGE IN BOWEL HABITS: ICD-10-CM

## 2018-08-24 DIAGNOSIS — K59.04 CHRONIC IDIOPATHIC CONSTIPATION: ICD-10-CM

## 2018-08-24 LAB — B-HCG UR QL: NEGATIVE

## 2018-08-24 PROCEDURE — 88305 TISSUE EXAM BY PATHOLOGIST: CPT | Performed by: PATHOLOGY

## 2018-08-24 PROCEDURE — 25010000002 MIDAZOLAM PER 1 MG: Performed by: NURSE ANESTHETIST, CERTIFIED REGISTERED

## 2018-08-24 PROCEDURE — 25010000002 PROPOFOL 10 MG/ML EMULSION: Performed by: NURSE ANESTHETIST, CERTIFIED REGISTERED

## 2018-08-24 PROCEDURE — 81025 URINE PREGNANCY TEST: CPT | Performed by: INTERNAL MEDICINE

## 2018-08-24 PROCEDURE — 88305 TISSUE EXAM BY PATHOLOGIST: CPT | Performed by: INTERNAL MEDICINE

## 2018-08-24 PROCEDURE — 43239 EGD BIOPSY SINGLE/MULTIPLE: CPT | Performed by: INTERNAL MEDICINE

## 2018-08-24 PROCEDURE — 45380 COLONOSCOPY AND BIOPSY: CPT | Performed by: INTERNAL MEDICINE

## 2018-08-24 RX ORDER — PROMETHAZINE HYDROCHLORIDE 25 MG/ML
12.5 INJECTION, SOLUTION INTRAMUSCULAR; INTRAVENOUS ONCE AS NEEDED
Status: DISCONTINUED | OUTPATIENT
Start: 2018-08-24 | End: 2018-08-24 | Stop reason: HOSPADM

## 2018-08-24 RX ORDER — LIDOCAINE HYDROCHLORIDE 10 MG/ML
INJECTION, SOLUTION INFILTRATION; PERINEURAL AS NEEDED
Status: DISCONTINUED | OUTPATIENT
Start: 2018-08-24 | End: 2018-08-24 | Stop reason: SURG

## 2018-08-24 RX ORDER — MIDAZOLAM HYDROCHLORIDE 1 MG/ML
INJECTION INTRAMUSCULAR; INTRAVENOUS AS NEEDED
Status: DISCONTINUED | OUTPATIENT
Start: 2018-08-24 | End: 2018-08-24 | Stop reason: SURG

## 2018-08-24 RX ORDER — ONDANSETRON 2 MG/ML
4 INJECTION INTRAMUSCULAR; INTRAVENOUS ONCE AS NEEDED
Status: DISCONTINUED | OUTPATIENT
Start: 2018-08-24 | End: 2018-08-24 | Stop reason: HOSPADM

## 2018-08-24 RX ORDER — DEXAMETHASONE SODIUM PHOSPHATE 4 MG/ML
8 INJECTION, SOLUTION INTRA-ARTICULAR; INTRALESIONAL; INTRAMUSCULAR; INTRAVENOUS; SOFT TISSUE ONCE AS NEEDED
Status: DISCONTINUED | OUTPATIENT
Start: 2018-08-24 | End: 2018-08-24 | Stop reason: HOSPADM

## 2018-08-24 RX ORDER — PROMETHAZINE HYDROCHLORIDE 25 MG/1
25 TABLET ORAL ONCE AS NEEDED
Status: DISCONTINUED | OUTPATIENT
Start: 2018-08-24 | End: 2018-08-24 | Stop reason: HOSPADM

## 2018-08-24 RX ORDER — MEPERIDINE HYDROCHLORIDE 50 MG/ML
12.5 INJECTION INTRAMUSCULAR; INTRAVENOUS; SUBCUTANEOUS
Status: DISCONTINUED | OUTPATIENT
Start: 2018-08-24 | End: 2018-08-24 | Stop reason: HOSPADM

## 2018-08-24 RX ORDER — PROPOFOL 10 MG/ML
VIAL (ML) INTRAVENOUS AS NEEDED
Status: DISCONTINUED | OUTPATIENT
Start: 2018-08-24 | End: 2018-08-24 | Stop reason: SURG

## 2018-08-24 RX ORDER — PROMETHAZINE HYDROCHLORIDE 25 MG/1
25 SUPPOSITORY RECTAL ONCE AS NEEDED
Status: DISCONTINUED | OUTPATIENT
Start: 2018-08-24 | End: 2018-08-24 | Stop reason: HOSPADM

## 2018-08-24 RX ORDER — DEXTROSE AND SODIUM CHLORIDE 5; .45 G/100ML; G/100ML
30 INJECTION, SOLUTION INTRAVENOUS CONTINUOUS PRN
Status: DISCONTINUED | OUTPATIENT
Start: 2018-08-24 | End: 2018-08-24 | Stop reason: HOSPADM

## 2018-08-24 RX ADMIN — LIDOCAINE HYDROCHLORIDE 50 MG: 10 INJECTION, SOLUTION INFILTRATION; PERINEURAL at 15:51

## 2018-08-24 RX ADMIN — MIDAZOLAM 2 MG: 1 INJECTION INTRAMUSCULAR; INTRAVENOUS at 15:48

## 2018-08-24 RX ADMIN — PROPOFOL 50 MG: 10 INJECTION, EMULSION INTRAVENOUS at 15:51

## 2018-08-24 RX ADMIN — DEXTROSE AND SODIUM CHLORIDE 30 ML/HR: 5; 450 INJECTION, SOLUTION INTRAVENOUS at 15:12

## 2018-08-24 RX ADMIN — PROPOFOL 50 MG: 10 INJECTION, EMULSION INTRAVENOUS at 16:01

## 2018-08-24 RX ADMIN — PROPOFOL 50 MG: 10 INJECTION, EMULSION INTRAVENOUS at 16:09

## 2018-08-24 RX ADMIN — PROPOFOL 50 MG: 10 INJECTION, EMULSION INTRAVENOUS at 15:58

## 2018-08-24 NOTE — ANESTHESIA POSTPROCEDURE EVALUATION
Patient: Swapna Vidales    Procedure Summary     Date:  08/24/18 Room / Location:  [facility] ENDOSCOPY 1 / [facility]    Anesthesia Start:  1548 Anesthesia Stop:  1621    Procedures:       ESOPHAGOGASTRODUODENOSCOPY (N/A )      COLONOSCOPY (N/A ) Diagnosis:       Generalized abdominal pain      Change in bowel habits      Chronic idiopathic constipation      (Generalized abdominal pain [R10.84])      (Change in bowel habits [R19.4])      (Chronic idiopathic constipation [K59.04])    Surgeon:  Jimmie Reilly MD Provider:  Augustine Fallon CRNA    Anesthesia Type:  MAC ASA Status:  3          Anesthesia Type: MAC  Last vitals  BP   123/74 (08/24/18 1503)   Temp   97.2 °F (36.2 °C) (08/24/18 1620)   Pulse   82 (08/24/18 1503)   Resp   18 (08/24/18 1503)     SpO2   99 % (08/24/18 1503)     Post Anesthesia Care and Evaluation    Patient location during evaluation: bedside  Patient participation: complete - patient cannot participate  Level of consciousness: awake  Pain score: 0  Pain management: adequate  Airway patency: patent  Anesthetic complications: No anesthetic complications  PONV Status: none  Cardiovascular status: acceptable  Respiratory status: acceptable  Hydration status: acceptable

## 2018-08-28 LAB
LAB AP CASE REPORT: NORMAL
PATH REPORT.FINAL DX SPEC: NORMAL
PATH REPORT.GROSS SPEC: NORMAL

## 2018-09-18 ENCOUNTER — APPOINTMENT (OUTPATIENT)
Dept: LAB | Facility: HOSPITAL | Age: 23
End: 2018-09-18

## 2018-09-18 ENCOUNTER — OFFICE VISIT (OUTPATIENT)
Dept: FAMILY MEDICINE CLINIC | Facility: CLINIC | Age: 23
End: 2018-09-18

## 2018-09-18 VITALS
OXYGEN SATURATION: 99 % | HEART RATE: 72 BPM | WEIGHT: 126.9 LBS | HEIGHT: 63 IN | DIASTOLIC BLOOD PRESSURE: 70 MMHG | SYSTOLIC BLOOD PRESSURE: 120 MMHG | BODY MASS INDEX: 22.48 KG/M2

## 2018-09-18 DIAGNOSIS — R22.0 FACIAL SWELLING: ICD-10-CM

## 2018-09-18 DIAGNOSIS — R63.4 WEIGHT LOSS: Primary | ICD-10-CM
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
## 2018-09-18 DIAGNOSIS — R20.2 TINGLING SENSATION: ICD-10-CM

## 2018-09-18 LAB
T4 FREE SERPL-MCNC: 1.28 NG/DL (ref 0.78–2.19)
TSH SERPL DL<=0.05 MIU/L-ACNC: 1.26 MIU/ML (ref 0.46–4.68)

## 2018-09-18 PROCEDURE — 84439 ASSAY OF FREE THYROXINE: CPT | Performed by: FAMILY MEDICINE

## 2018-09-18 PROCEDURE — 86618 LYME DISEASE ANTIBODY: CPT | Performed by: FAMILY MEDICINE

## 2018-09-18 PROCEDURE — 84443 ASSAY THYROID STIM HORMONE: CPT | Performed by: FAMILY MEDICINE

## 2018-09-18 PROCEDURE — 36415 COLL VENOUS BLD VENIPUNCTURE: CPT | Performed by: FAMILY MEDICINE

## 2018-09-18 PROCEDURE — 86757 RICKETTSIA ANTIBODY: CPT | Performed by: FAMILY MEDICINE

## 2018-09-18 PROCEDURE — 86666 EHRLICHIA ANTIBODY: CPT | Performed by: FAMILY MEDICINE

## 2018-09-18 PROCEDURE — 99214 OFFICE O/P EST MOD 30 MIN: CPT | Performed by: FAMILY MEDICINE

## 2018-09-18 RX ORDER — MELOXICAM 15 MG/1
15 TABLET ORAL DAILY
Qty: 15 TABLET | Refills: 0 | Status: SHIPPED | OUTPATIENT
Start: 2018-09-18 | End: 2018-09-18 | Stop reason: SDUPTHER

## 2018-09-18 RX ORDER — MELOXICAM 15 MG/1
15 TABLET ORAL DAILY
Qty: 15 TABLET | Refills: 0 | Status: SHIPPED | OUTPATIENT
Start: 2018-09-18 | End: 2019-02-27

## 2018-09-18 NOTE — PROGRESS NOTES
Subjective   Swapna Vidales is a 22 y.o. female.     Neurologic Problem   The patient's pertinent negatives include no focal sensory loss, near-syncope or syncope. The current episode started in the past 7 days. The neurological problem developed suddenly. The problem is unchanged. There was right-sided focality noted. Associated symptoms include a fever, light-headedness and neck pain. Pertinent negatives include no dizziness, vertigo or vomiting. Past treatments include nothing. The treatment provided no relief.   Neck Pain    This is a new problem. The current episode started in the past 7 days. The problem occurs intermittently. The problem has been waxing and waning. The pain is associated with nothing. The pain is present in the right side. Quality: swollenand numb. The patient is experiencing no pain. Associated symptoms include a fever.   Insect Bite   This is a new problem. The current episode started 1 to 4 weeks ago. The problem occurs constantly. The problem has been unchanged. Associated symptoms include a fever, neck pain and swollen glands. Pertinent negatives include no chills, sore throat, vertigo or vomiting. Nothing aggravates the symptoms. She has tried nothing for the symptoms. The treatment provided no relief.        The following portions of the patient's history were reviewed and updated as appropriate: allergies, current medications, past family history, past medical history, past social history, past surgical history and problem list.    Review of Systems   Constitutional: Positive for fever. Negative for chills.   HENT: Negative for sore throat.         Right facial swelling   Cardiovascular: Negative for near-syncope.   Gastrointestinal: Negative for vomiting.   Neurological: Positive for light-headedness. Negative for dizziness, vertigo, tremors and syncope.       Objective   Physical Exam   Constitutional: She is oriented to person, place, and time. She appears  well-developed and well-nourished. No distress.   HENT:   Head: Normocephalic and atraumatic.   Right Ear: Hearing and tympanic membrane normal.   Left Ear: Hearing and tympanic membrane normal.   Nose: Nose normal.   Mouth/Throat: Uvula is midline, oropharynx is clear and moist and mucous membranes are normal.   Neck: Trachea normal, normal range of motion and full passive range of motion without pain. Neck supple. No tracheal tenderness present. No neck rigidity. No edema and no erythema present. No thyroid mass and no thyromegaly present.   Neurological: She is alert and oriented to person, place, and time. She has normal strength. She displays a negative Romberg sign.   Reflex Scores:       Bicep reflexes are 2+ on the right side and 2+ on the left side.       Patellar reflexes are 2+ on the right side and 2+ on the left side.  Skin: Skin is warm and dry. She is not diaphoretic.   Psychiatric: She has a normal mood and affect. Her behavior is normal. Judgment and thought content normal.   Nursing note and vitals reviewed.      Assessment/Plan   Problems Addressed this Visit     None      Visit Diagnoses     Weight loss    -  Primary    Relevant Orders    T4, Free    TSH    Tick bite, initial encounter        Relevant Orders    [place] (IgG / M)    Lyme, Total Antibody Test / Reflex    Ehrlichia Antibody Panel    Tingling sensation        Facial swelling

## 2018-09-19 LAB — B BURGDOR IGG+IGM SER-ACNC: <0.91 ISR (ref 0–0.9)

## 2018-09-20 LAB
R RICKETTSI IGG SER QL IA: NEGATIVE
R RICKETTSI IGM TITR SER: 0.31 INDEX (ref 0–0.89)

## 2018-09-20 NOTE — PROGRESS NOTES
NORMAL LABS, LETTER SENT  I have not been able to contact the patient by phone  She may have already seen the results on MYChart, That may be the reason she has not called back.

## 2018-09-21 LAB
A PHAGOCYTOPH IGM TITR SER IF: NEGATIVE {TITER}
CONV HGE IGG TITER: NEGATIVE
E CHAFFEENSIS IGG TITR SER IF: NEGATIVE {TITER}
E. CHAFFEENSIS (HME) IGM TITER: ABNORMAL

## 2018-09-24 NOTE — PROGRESS NOTES
Ehrlichiosis titer was positive.  Recommend doxycycline 100 mg twice a day for 10 days make sure there is no chance she can be pregnant

## 2018-09-25 ENCOUNTER — OFFICE VISIT (OUTPATIENT)
Dept: GASTROENTEROLOGY | Facility: CLINIC | Age: 23
End: 2018-09-25

## 2018-09-25 VITALS
HEART RATE: 72 BPM | WEIGHT: 118 LBS | HEIGHT: 62 IN | BODY MASS INDEX: 21.71 KG/M2 | SYSTOLIC BLOOD PRESSURE: 110 MMHG | DIASTOLIC BLOOD PRESSURE: 70 MMHG

## 2018-09-25 DIAGNOSIS — K58.1 IRRITABLE BOWEL SYNDROME WITH CONSTIPATION: Primary | ICD-10-CM

## 2018-09-25 PROCEDURE — 99214 OFFICE O/P EST MOD 30 MIN: CPT | Performed by: INTERNAL MEDICINE

## 2018-09-25 NOTE — PROGRESS NOTES
[facility]      Chief Complaint:   Chief Complaint   Patient presents with   • Gen. abd pain   • change in bowel habits     C/C       Subjective     HPI:   Patient with right upper quadrant abdominal pain and constipation.  Patient has had some improvement in symptoms since changing her diet.  Patient is still constipated at times.  Patient with irritable bowel syndrome with constipation.    Plan; we'll start patient on trulance 3 mg daily.  The patient follow up in 3 months see if any improvement in symptoms.  Discussed with patient altering her diet to improve symptoms also.    Past Medical History:   Past Medical History:   Diagnosis Date   • Abdominal pain    • Acute maxillary sinusitis    • Acute pharyngitis    • Acute sinusitis    • Acute tonsillitis    • Acute upper respiratory infection    • Allergic rhinitis    • Bacterial vaginosis    • Bronchitis    • Conjunctivitis    • Cystitis    • Diarrhea    • Encounter for general counseling and advice on contraceptive management    • Encounter for  visit      visit status     • Encounters for administrative purposes     Exam, medical, administrative    • Gastroenteritis    • Genital herpes simplex    • Increased frequency of urination    • Influenza    • Irregular menses    • Irregular menstruation     unspecified     • Nausea and vomiting    • Otitis media    • Patient currently pregnant    • Pelvic pain     C/O pelvic pain    • Respiratory syncytial virus infection     three months of age     • Routine postpartum follow-up    • Streptococcal sore throat    • Upper respiratory infection        Family History:  Family History   Problem Relation Age of Onset   • Hyperlipidemia Father         High Cholesterol   • Heart block Father    • Diabetes Father    • Hypertension Father    • Ovarian cysts Sister    • Thyroid disease Mother         hypothyroidism   • Hypertension Mother    • Hyperlipidemia Mother    • Heart disease  Other    • Hypertension Other    • Diabetes Other        Social History:   reports that she has never smoked. She has never used smokeless tobacco. She reports that she does not drink alcohol or use drugs.    Medications:   Prior to Admission medications    Medication Sig Start Date End Date Taking? Authorizing Provider   albuterol (PROVENTIL HFA;VENTOLIN HFA) 108 (90 Base) MCG/ACT inhaler Inhale 2 puffs Every 6 (Six) Hours As Needed for Wheezing or Shortness of Air. 9/14/18  Yes Promise Viramontes PA-C   levonorgestrel (MIRENA) 20 MCG/24HR IUD 1 each by Intrauterine route 1 (One) Time.   Yes Provider, MD Moris   meloxicam (MOBIC) 15 MG tablet Take 1 tablet by mouth Daily. 9/18/18  Yes Sony Francisco MD   promethazine-dextromethorphan (PROMETHAZINE-DM) 6.25-15 MG/5ML syrup Take 5 mL by mouth 4 (Four) Times a Day As Needed for Cough. 9/10/18  Yes Carol Sanchez APRN   azithromycin (ZITHROMAX) 250 MG tablet Take 2 tablets the first day, then 1 tablet daily for 4 days. 9/14/18   Promise Viramontes PA-C   cefuroxime (CEFTIN) 500 MG tablet Take 1 tablet by mouth 2 (Two) Times a Day. 9/10/18   Carol Sanchez APRN   dicyclomine (BENTYL) 20 MG tablet Take 1 tablet by mouth Every 6 (Six) Hours As Needed (Abdominal Cramping). 4/23/18   Lyudmila Alvarez APRN   Plecanatide (TRULANCE) 3 MG tablet Take 1 tablet by mouth Daily. 9/25/18   Jimmie Reilly MD   predniSONE (DELTASONE) 10 MG tablet Take 1 tablet by mouth 2 (Two) Times a Day. 9/14/18   Promise Viramontes PA-C       Allergies:  Patient has no known allergies.    ROS:    Review of Systems   Constitutional: Negative for activity change, appetite change, chills, diaphoresis, fatigue, fever and unexpected weight change.   HENT: Negative for sore throat and trouble swallowing.    Respiratory: Negative for shortness of breath.    Gastrointestinal: Positive for abdominal pain and constipation. Negative for abdominal distention, anal bleeding, blood in  "stool, diarrhea, nausea, rectal pain and vomiting.   Musculoskeletal: Negative for arthralgias.   Skin: Negative for pallor.   Neurological: Negative for light-headedness.     Objective     Blood pressure 110/70, pulse 72, height 157.5 cm (62\"), weight 53.5 kg (118 lb), not currently breastfeeding.    Physical Exam   Constitutional: She is oriented to person, place, and time. She appears well-developed and well-nourished. No distress.   HENT:   Head: Normocephalic and atraumatic.   Cardiovascular: Normal rate, regular rhythm, normal heart sounds and intact distal pulses.  Exam reveals no gallop and no friction rub.    No murmur heard.  Pulmonary/Chest: Breath sounds normal. No respiratory distress. She has no wheezes. She has no rales. She exhibits no tenderness.   Abdominal: Soft. Bowel sounds are normal. She exhibits no distension and no mass. There is tenderness. There is no rebound and no guarding. No hernia.   Musculoskeletal: Normal range of motion. She exhibits no edema.   Neurological: She is alert and oriented to person, place, and time.   Skin: Skin is warm and dry. No rash noted. She is not diaphoretic. No erythema. No pallor.   Psychiatric: She has a normal mood and affect. Her behavior is normal. Judgment and thought content normal.        Assessment/Plan   Swapna was seen today for gen. abd pain and change in bowel habits.    Diagnoses and all orders for this visit:    Irritable bowel syndrome with constipation    Other orders  -     Plecanatide (TRULANCE) 3 MG tablet; Take 1 tablet by mouth Daily.        * Surgery not found *     Diagnosis Plan   1. Irritable bowel syndrome with constipation         Anticipated Surgical Procedure:  No orders of the defined types were placed in this encounter.      The risks, benefits, and alternatives of this procedure have been discussed with the patient or the responsible party- the patient understands and agrees to " proceed.

## 2018-09-25 NOTE — PATIENT INSTRUCTIONS

## 2018-10-01 ENCOUNTER — TELEPHONE (OUTPATIENT)
Dept: GASTROENTEROLOGY | Facility: CLINIC | Age: 23
End: 2018-10-01

## 2018-10-01 NOTE — TELEPHONE ENCOUNTER
10/01/2018, 1431 - Patient telephoned per this staff member [phone].  Zero answer.  Voice message submitted with date, time, office contact information, and notification Prior Authorization denial received per patient's insurance, [employer], regarding Trulance 3 MG Tablets due to lack of 7 day trial and failure within past 30 days with lower cost drugs Citrucel, Metamucil, Miralax, Dulcolax, Senokot, Colace.

## 2018-10-02 ENCOUNTER — OFFICE VISIT (OUTPATIENT)
Dept: FAMILY MEDICINE CLINIC | Facility: CLINIC | Age: 23
End: 2018-10-02

## 2018-10-02 ENCOUNTER — TELEPHONE (OUTPATIENT)
Dept: FAMILY MEDICINE CLINIC | Facility: CLINIC | Age: 23
End: 2018-10-02

## 2018-10-02 VITALS
BODY MASS INDEX: 23.74 KG/M2 | DIASTOLIC BLOOD PRESSURE: 70 MMHG | OXYGEN SATURATION: 99 % | SYSTOLIC BLOOD PRESSURE: 116 MMHG | HEART RATE: 64 BPM | HEIGHT: 62 IN | WEIGHT: 129 LBS

## 2018-10-02 DIAGNOSIS — A77.40 EHRLICHIOSIS: Primary | ICD-10-CM

## 2018-10-02 PROCEDURE — 99213 OFFICE O/P EST LOW 20 MIN: CPT | Performed by: FAMILY MEDICINE

## 2018-10-02 RX ORDER — DOXYCYCLINE 100 MG/1
100 TABLET ORAL 2 TIMES DAILY
Qty: 14 TABLET | Refills: 0 | Status: SHIPPED | OUTPATIENT
Start: 2018-10-02 | End: 2018-10-09

## 2018-10-02 NOTE — TELEPHONE ENCOUNTER
----- Message from Sony Francisco MD sent at 9/24/2018  4:05 PM CDT -----  Ehrlichiosis titer was positive.  Recommend doxycycline 100 mg twice a day for 10 days make sure there is no chance she can be pregnant

## 2018-10-02 NOTE — TELEPHONE ENCOUNTER
Spoke with patient in room at f/u appt today.  Relayed results and recommendations per DR Francisco.  Pt verbalized understanding.

## 2018-10-02 NOTE — PROGRESS NOTES
Subjective   Swapna Vidales is a 22 y.o. female.     Neurologic Problem   The patient's pertinent negatives include no focal sensory loss, near-syncope or syncope. The current episode started in the past 7 days. The neurological problem developed suddenly. The problem is unchanged. There was right-sided focality noted. Pertinent negatives include no dizziness or vertigo. Past treatments include nothing. The treatment provided no relief.   Insect Bite   This is a new (Ehrlichia IgM titer pos. Doxycycline given) problem. The current episode started 1 to 4 weeks ago. The problem occurs constantly. The problem has been unchanged. Associated symptoms include swollen glands. Pertinent negatives include no vertigo. Nothing aggravates the symptoms. She has tried nothing for the symptoms. The treatment provided no relief.        The following portions of the patient's history were reviewed and updated as appropriate: allergies, current medications, past family history, past medical history, past social history, past surgical history and problem list.    Review of Systems   Cardiovascular: Negative for near-syncope.   Neurological: Negative for dizziness, vertigo, tremors and syncope.       Objective   Physical Exam   Constitutional: She is oriented to person, place, and time. She appears well-developed and well-nourished. No distress.   HENT:   Head: Normocephalic and atraumatic.   Right Ear: Hearing and tympanic membrane normal.   Left Ear: Hearing and tympanic membrane normal.   Nose: Nose normal.   Mouth/Throat: Uvula is midline, oropharynx is clear and moist and mucous membranes are normal.   Neck: Trachea normal, normal range of motion and full passive range of motion without pain. Neck supple. No tracheal tenderness present. No neck rigidity. No edema and no erythema present. No thyroid mass and no thyromegaly present.   Neurological: She is alert and oriented to person, place, and time.   Skin: Skin is  warm and dry. She is not diaphoretic.   Psychiatric: She has a normal mood and affect. Her behavior is normal. Judgment and thought content normal.   Nursing note and vitals reviewed.      Assessment/Plan   Problems Addressed this Visit     None      Visit Diagnoses     Ehrlichiosis    -  Primary    Relevant Medications    doxycycline (ADOXA) 100 MG tablet

## 2018-10-03 ENCOUNTER — OFFICE VISIT (OUTPATIENT)
Dept: OBSTETRICS AND GYNECOLOGY | Facility: CLINIC | Age: 23
End: 2018-10-03

## 2018-10-03 VITALS
SYSTOLIC BLOOD PRESSURE: 108 MMHG | DIASTOLIC BLOOD PRESSURE: 67 MMHG | HEIGHT: 62 IN | HEART RATE: 79 BPM | WEIGHT: 129 LBS | BODY MASS INDEX: 23.74 KG/M2

## 2018-10-03 DIAGNOSIS — Z30.431 SURVEILLANCE OF (INTRAUTERINE) CONTRACEPTIVE DEVICE: ICD-10-CM

## 2018-10-03 DIAGNOSIS — R10.2 PELVIC PAIN: Primary | ICD-10-CM

## 2018-10-03 PROCEDURE — 99214 OFFICE O/P EST MOD 30 MIN: CPT | Performed by: NURSE PRACTITIONER

## 2018-10-03 NOTE — PROGRESS NOTES
Subjective   Chief Complaint   Patient presents with   • Pelvic Pain     IUD pain     Swapna Vidales is a 22 y.o. year old  presenting to be seen because of pelvic pain .   Current birth control method: IUD - Mirena.     Patient's last menstrual period was 10/03/2018. Mirena placed 17; has had monthly periods since then.     Past 6 month menstrual history:    Cycle Frequency: regular, predictable and consistent every 28 - 32 days   Menstrual cycle character: flow is typically light   Cycle Duration: 6 - 7   Number of heavy days of flows: 0   Dysmenorrhea: none and is not affecting her activities of daily living   PMS: none and is not affecting her activities of daily living   Intermenstrual bleeding present: {no   Post-coital bleeding present: no     No Additional Complaints Reported    The following portions of the patient's history were reviewed and updated as appropriate:problem list, current medications, allergies and past social history    Smoking status: Never Smoker                                                              Smokeless tobacco: Never Used                        Review of Systems   Constitutional: Negative for activity change, appetite change, chills, diaphoresis, fatigue, fever and unexpected weight change.   Respiratory: Negative for chest tightness and shortness of breath.    Cardiovascular: Negative for chest pain and palpitations.   Gastrointestinal: Positive for abdominal pain. Negative for abdominal distention, constipation, diarrhea, nausea and vomiting.        RUQ only   Genitourinary: Positive for dyspareunia, pelvic pain and vaginal discharge. Negative for difficulty urinating, dysuria, menstrual problem, vaginal bleeding and vaginal pain.   Musculoskeletal: Positive for back pain.   Neurological: Negative for light-headedness and headaches.   Psychiatric/Behavioral: Negative for agitation, dysphoric mood and sleep disturbance. The patient is not  "nervous/anxious.          Objective   /67   Pulse 79   Ht 157.5 cm (62\")   Wt 58.5 kg (129 lb)   LMP 10/03/2018   Breastfeeding? No   BMI 23.59 kg/m²     General:  well developed; well nourished  no acute distress  appears stated age  not distressed   Skin:  No suspicious lesions seen   Thyroid: not examined   Lungs:  breathing is unlabored  clear to auscultation bilaterally   Heart:  regular rate and rhythm, S1, S2 normal, no murmur, click, rub or gallop   Breasts:  Not performed.   Abdomen: Not performed.   Pelvis: Clinical staff was present for exam  External genitalia:  normal appearance of the external genitalia including Bartholin's and Cowlic's glands.  :  urethral meatus normal;  Vaginal:  normal pink mucosa without prolapse or lesions.  Cervix:  normal appearance. cervical motion tenderness is present; IUD strings visible  Uterus:  normal size, shape and consistency. tenderness to palpation is present;  Adnexa:  tenderness of the bilateral adnexa to  superficial palpation;     Lab Review   2017 pap WNL    Imaging   No data reviewed           No orders of the defined types were placed in this encounter.      Diagnoses and all orders for this visit:    Pelvic pain  -     OneSwab - Kit, Vagina; Future  -     US Non-ob Transvaginal; Future    Surveillance of (intrauterine) contraceptive device    Pelvic u/s & One Swab to identify potential causes of pelvic pain.        This note was electronically signed.    DANIEL Borrero  October 3, 2018  "

## 2018-10-16 ENCOUNTER — TELEPHONE (OUTPATIENT)
Dept: OBSTETRICS AND GYNECOLOGY | Facility: CLINIC | Age: 23
End: 2018-10-16

## 2018-10-16 NOTE — TELEPHONE ENCOUNTER
Notified the pt of her One Swab results.  The test shows no infections, however, it does show that the pt has NO healthy yeast.  I encouraged the pt to start taking probiotics daily (Lactobacilli) for at least 3 months, to help build up her healthy jayla.  The pt verbalized understanding.

## 2018-10-19 DIAGNOSIS — R10.2 PELVIC PAIN: ICD-10-CM

## 2019-03-19 ENCOUNTER — OFFICE VISIT (OUTPATIENT)
Dept: FAMILY MEDICINE CLINIC | Facility: CLINIC | Age: 24
End: 2019-03-19

## 2019-03-19 VITALS
HEIGHT: 63 IN | BODY MASS INDEX: 23.02 KG/M2 | DIASTOLIC BLOOD PRESSURE: 70 MMHG | SYSTOLIC BLOOD PRESSURE: 116 MMHG | WEIGHT: 129.9 LBS | OXYGEN SATURATION: 99 % | HEART RATE: 81 BPM

## 2019-03-19 DIAGNOSIS — R59.1 LYMPHADENOPATHY: ICD-10-CM

## 2019-03-19 DIAGNOSIS — H65.93 BILATERAL OTITIS MEDIA WITH EFFUSION: Primary | ICD-10-CM

## 2019-03-19 PROCEDURE — 99213 OFFICE O/P EST LOW 20 MIN: CPT | Performed by: FAMILY MEDICINE

## 2019-03-19 RX ORDER — AMOXICILLIN AND CLAVULANATE POTASSIUM 875; 125 MG/1; MG/1
1 TABLET, FILM COATED ORAL 2 TIMES DAILY
Qty: 14 TABLET | Refills: 0 | Status: SHIPPED | OUTPATIENT
Start: 2019-03-19 | End: 2019-03-26

## 2019-03-19 NOTE — PROGRESS NOTES
Myrtle Vidales is a 23 y.o. female.     URI    This is a new problem. The current episode started in the past 7 days. The problem has been rapidly worsening. There has been no fever. Associated symptoms include congestion, headaches, neck pain and a plugged ear sensation. Pertinent negatives include no coughing, sinus pain, sneezing or sore throat. She has tried nothing (had amoxil for sinus infection rescently) for the symptoms. The treatment provided no relief.        The following portions of the patient's history were reviewed and updated as appropriate: allergies, current medications, past family history, past medical history, past social history, past surgical history and problem list.    Review of Systems   HENT: Positive for congestion. Negative for sinus pain, sneezing and sore throat.    Respiratory: Negative for cough.    Musculoskeletal: Positive for neck pain.   Neurological: Positive for headaches.       Objective   Physical Exam   Constitutional: She is oriented to person, place, and time. She appears well-developed and well-nourished. No distress.   HENT:   Head: Normocephalic and atraumatic.   Right Ear: Hearing normal. Tympanic membrane is retracted.   Left Ear: Hearing normal. Tympanic membrane is retracted.   Nose: Mucosal edema and rhinorrhea present.   Mouth/Throat: Uvula is midline, oropharynx is clear and moist and mucous membranes are normal.   Pulmonary/Chest: Effort normal and breath sounds normal. She has no decreased breath sounds. She has no wheezes. She has no rhonchi.   Lymphadenopathy:        Head (right side): No submental and no submandibular adenopathy present.        Head (left side): No submental and no submandibular adenopathy present.     She has cervical adenopathy.        Right cervical: Posterior cervical adenopathy present. No superficial cervical adenopathy present.       Left cervical: No superficial cervical, no deep cervical and no posterior  cervical adenopathy present.        Right: No supraclavicular adenopathy present.        Left: No supraclavicular adenopathy present.   Neurological: She is alert and oriented to person, place, and time.   Skin: Skin is warm and dry. She is not diaphoretic.   Psychiatric: She has a normal mood and affect. Her behavior is normal. Judgment and thought content normal.   Nursing note and vitals reviewed.      Assessment/Plan   Problems Addressed this Visit     None      Visit Diagnoses     Bilateral otitis media with effusion    -  Primary    Lymphadenopathy

## 2019-07-30 ENCOUNTER — OFFICE VISIT (OUTPATIENT)
Dept: OTOLARYNGOLOGY | Facility: CLINIC | Age: 24
End: 2019-07-30

## 2019-07-30 VITALS — BODY MASS INDEX: 24.11 KG/M2 | HEIGHT: 62 IN | TEMPERATURE: 97.7 F | WEIGHT: 131 LBS

## 2019-07-30 DIAGNOSIS — H61.23 BILATERAL IMPACTED CERUMEN: ICD-10-CM

## 2019-07-30 DIAGNOSIS — M54.2 POSTERIOR NECK PAIN: Primary | ICD-10-CM

## 2019-07-30 PROCEDURE — 69210 REMOVE IMPACTED EAR WAX UNI: CPT | Performed by: OTOLARYNGOLOGY

## 2019-07-30 PROCEDURE — 99203 OFFICE O/P NEW LOW 30 MIN: CPT | Performed by: OTOLARYNGOLOGY

## 2019-07-30 RX ORDER — TIZANIDINE 4 MG/1
TABLET ORAL
Qty: 12 TABLET | Refills: 0 | Status: SHIPPED | OUTPATIENT
Start: 2019-07-30 | End: 2019-11-18

## 2019-08-02 NOTE — PROGRESS NOTES
Subjective   Swapna Vidales is a 23 y.o. female.     History of Present Illness   Patient reports that for 4 months she has had right posterior neck pain.  Says she was told it was an enlarged lymph node.  Was also diagnosed with an ear infection around the same time.  Feels like her ears are stopped up and swollen.  She is been prescribed Flonase and Zyrtec.  States that the pain in her neck radiates down into her shoulder and her back.  Thinks that the mass in her neck gets bigger and smaller from time to time.  Denies fevers, chills, night sweats.      The following portions of the patient's history were reviewed and updated as appropriate: allergies, current medications, past family history, past medical history, past social history, past surgical history and problem list.      Swapna Vidales reports that she has never smoked. She has never used smokeless tobacco. She reports that she does not drink alcohol or use drugs.  Patient is not a tobacco user and has not been counseled for use of tobacco products    Family History   Problem Relation Age of Onset   • Hyperlipidemia Father         High Cholesterol   • Heart block Father    • Diabetes Father    • Hypertension Father    • Ovarian cysts Sister    • Thyroid disease Mother         hypothyroidism   • Hypertension Mother    • Hyperlipidemia Mother    • Heart disease Other    • Hypertension Other    • Diabetes Other        No Known Allergies      Current Outpatient Medications:   •  cetirizine (zyrTEC) 10 MG tablet, Take 10 mg by mouth Daily., Disp: , Rfl:   •  levonorgestrel (MIRENA) 20 MCG/24HR IUD, 1 each by Intrauterine route 1 (One) Time., Disp: , Rfl:   •  albuterol sulfate  (90 Base) MCG/ACT inhaler, Inhale 2 puffs Every 4 (Four) Hours As Needed for Wheezing., Disp: 1 inhaler, Rfl: 0  •  fluticasone (FLONASE) 50 MCG/ACT nasal spray, 2 sprays into the nostril(s) as directed by provider Daily., Disp: 1 bottle, Rfl: 0  •   tiZANidine (ZANAFLEX) 4 MG tablet, 1 pill twice a day as needed for muscle spasm, Disp: 12 tablet, Rfl: 0    Past Medical History:   Diagnosis Date   • Abdominal pain    • Acute maxillary sinusitis    • Acute pharyngitis    • Acute sinusitis    • Acute tonsillitis    • Acute upper respiratory infection    • Allergic rhinitis    • Bacterial vaginosis    • Bronchitis    • Conjunctivitis    • Cystitis    • Diarrhea    • Encounter for general counseling and advice on contraceptive management    • Encounter for  visit      visit status     • Encounters for administrative purposes     Exam, medical, administrative    • Gastroenteritis    • Genital herpes simplex    • Headache    • Increased frequency of urination    • Influenza    • Irregular menses    • Irregular menstruation     unspecified     • Nausea and vomiting    • Otitis media    • Patient currently pregnant    • Pelvic pain     C/O pelvic pain    • Respiratory syncytial virus infection     three months of age     • Routine postpartum follow-up    • Streptococcal sore throat    • Upper respiratory infection        Past Surgical History:   Procedure Laterality Date   • COLONOSCOPY N/A 2018    Procedure: COLONOSCOPY;  Surgeon: Jimmie Reilly MD;  Location: [facility] ENDOSCOPY;  Service: Gastroenterology   • ENDOSCOPY N/A 2018    Procedure: ESOPHAGOGASTRODUODENOSCOPY;  Surgeon: Jimmie Reilly MD;  Location: [facility] ENDOSCOPY;  Service: Gastroenterology   • VAGINAL DELIVERY  2013    Spontaneous vaginal delivery at term.         Review of Systems   Constitutional: Positive for appetite change.   HENT: Positive for ear pain.    Eyes: Positive for visual disturbance.   Respiratory: Positive for shortness of breath.    Cardiovascular: Positive for chest pain.   Musculoskeletal: Positive for back pain and neck pain.   Neurological: Positive for dizziness, numbness and headaches.   All other systems reviewed and are  negative.          Objective   Physical Exam  General: Well-developed well-nourished female in no acute distress.  Alert and oriented x-3. Head: Normocephalic. Face: Symmetrical strength and appearance. PERRL. EOMI. Voice:Strong. Speech:Fluent  Ears: External ears no deformity, both ear canals are occluded with cerumen impactions  Using the binocular microscope for visualization, cerumen impaction was removed from bilateral ear canal(s) using instrumentation. This was personally performed by Rohit Pereira MD.  Following cerumen removal tympanic membranes are noted to be intact with no infection or effusion  Nose: Nares show no discharge mass polyp or purulence.  Boggy mucosa is present.  No gross external deformity.  Septum: Midline  Oral cavity: Lips and gums without lesions.  Tongue and floor of mouth without lesions.  Parotid and submandibular ducts unobstructed.  No mucosal lesions on the buccal mucosa or vestibule of the mouth.  Pharynx: No erythema exudate mass or ulcer  Neck: No lymphadenopathy.  No thyromegaly.  Trachea and larynx midline.  No masses in the parotid or submandibular glands.  The area of abnormality in the patient's right suboccipital neck is actually an area of spasm of the trapezius muscle.  On palpation the patient states pain radiates into her shoulder and her upper back.        Assessment/Plan   Swapna was seen today for neck mass.    Diagnoses and all orders for this visit:    Posterior neck pain    Bilateral impacted cerumen    Other orders  -     tiZANidine (ZANAFLEX) 4 MG tablet; 1 pill twice a day as needed for muscle spasm        Plan: Cerumen removed as described above.  Explained my findings to the patient.  Reassured her this was not lymphadenopathy.  Gave her Zanaflex 4 mg twice daily to try and if this helps she should discuss this further with her family doctor.  Follow-up with me as needed.

## 2019-08-29 ENCOUNTER — PROCEDURE VISIT (OUTPATIENT)
Dept: OBSTETRICS AND GYNECOLOGY | Facility: CLINIC | Age: 24
End: 2019-08-29

## 2019-08-29 VITALS
WEIGHT: 127 LBS | HEIGHT: 62 IN | BODY MASS INDEX: 23.37 KG/M2 | DIASTOLIC BLOOD PRESSURE: 62 MMHG | SYSTOLIC BLOOD PRESSURE: 100 MMHG

## 2019-08-29 DIAGNOSIS — Z30.432 ENCOUNTER FOR REMOVAL OF INTRAUTERINE CONTRACEPTIVE DEVICE: Primary | ICD-10-CM

## 2019-08-29 DIAGNOSIS — Z30.015 ENCOUNTER FOR INITIAL PRESCRIPTION OF VAGINAL RING HORMONAL CONTRACEPTIVE: ICD-10-CM

## 2019-08-29 PROBLEM — Z30.016 ENCOUNTER FOR INITIAL PRESCRIPTION OF TRANSDERMAL PATCH HORMONAL CONTRACEPTIVE DEVICE: Status: ACTIVE | Noted: 2019-08-29

## 2019-08-29 PROCEDURE — 58301 REMOVE INTRAUTERINE DEVICE: CPT | Performed by: NURSE PRACTITIONER

## 2019-08-29 RX ORDER — ETONOGESTREL AND ETHINYL ESTRADIOL 11.7; 2.7 MG/1; MG/1
INSERT, EXTENDED RELEASE VAGINAL
Qty: 1 EACH | Refills: 12 | Status: SHIPPED | OUTPATIENT
Start: 2019-08-29 | End: 2019-11-18

## 2019-08-29 NOTE — PROGRESS NOTES
IUD Removal    Date of procedure:  8/29/2019    Risks and benefits discussed? yes  All questions answered? yes  Consents given by The patient  Written consent obtained? yes  Reason for removal: Side effect: ovarian cysts    Local anesthesia used:  no    Procedure documentation:    A speculum was placed in order to view the cervix.  The cervix did not need to be grasped.  Cervical dilation did not need to be performed in order to access the string.  The IUD string was easily seen.  The string was grasped and the IUD was removed without difficulty.  The IUD did appear to be adherent to the uterine cavity. It was removed intact.    She tolerated the procedure without any difficulty.     Post procedure instructions: Patient notified to call with heavy bleeding, fever or increasing pain.    Contraception desired: Nuvaring; R/B/A and potential s/e reviewed.     Follow up needed: 1 year(s) for pap smear or sooner, OZIEL Lewisie was seen today for procedure.    Diagnoses and all orders for this visit:    Encounter for removal of intrauterine contraceptive device    Encounter for initial prescription of vaginal ring hormonal contraceptive    Other orders  -     etonogestrel-ethinyl estradiol (NUVARING) 0.12-0.015 MG/24HR vaginal ring; Insert vaginally and leave in place for 3 consecutive weeks, then remove for 1 week.        This note was electronically signed.    Mar Mnacia, APRN  August 29, 2019

## 2019-11-20 ENCOUNTER — LAB (OUTPATIENT)
Dept: LAB | Facility: HOSPITAL | Age: 24
End: 2019-11-20

## 2019-11-20 ENCOUNTER — OFFICE VISIT (OUTPATIENT)
Dept: FAMILY MEDICINE CLINIC | Facility: CLINIC | Age: 24
End: 2019-11-20

## 2019-11-20 VITALS
DIASTOLIC BLOOD PRESSURE: 80 MMHG | HEIGHT: 63 IN | WEIGHT: 128 LBS | HEART RATE: 84 BPM | OXYGEN SATURATION: 100 % | BODY MASS INDEX: 22.68 KG/M2 | SYSTOLIC BLOOD PRESSURE: 116 MMHG

## 2019-11-20 DIAGNOSIS — R10.11 RIGHT UPPER QUADRANT ABDOMINAL PAIN: ICD-10-CM

## 2019-11-20 DIAGNOSIS — Z23 FLU VACCINE NEED: ICD-10-CM

## 2019-11-20 DIAGNOSIS — R10.11 RIGHT UPPER QUADRANT ABDOMINAL PAIN: Primary | ICD-10-CM

## 2019-11-20 DIAGNOSIS — M25.9 SHOULDER PROBLEM: ICD-10-CM

## 2019-11-20 LAB
ALBUMIN SERPL-MCNC: 4.9 G/DL (ref 3.5–5.2)
ALBUMIN/GLOB SERPL: 1.4 G/DL
ALP SERPL-CCNC: 44 U/L (ref 39–117)
ALT SERPL W P-5'-P-CCNC: 10 U/L (ref 1–33)
AMORPH URATE CRY URNS QL MICRO: NORMAL /HPF
AMYLASE SERPL-CCNC: 36 U/L (ref 28–100)
ANION GAP SERPL CALCULATED.3IONS-SCNC: 13.1 MMOL/L (ref 5–15)
AST SERPL-CCNC: 14 U/L (ref 1–32)
BACTERIA UR QL AUTO: NORMAL /HPF
BILIRUB SERPL-MCNC: 0.5 MG/DL (ref 0.2–1.2)
BILIRUB UR QL STRIP: NEGATIVE
BUN BLD-MCNC: 9 MG/DL (ref 6–20)
BUN/CREAT SERPL: 13 (ref 7–25)
CALCIUM SPEC-SCNC: 9.3 MG/DL (ref 8.6–10.5)
CHLORIDE SERPL-SCNC: 98 MMOL/L (ref 98–107)
CLARITY UR: ABNORMAL
CO2 SERPL-SCNC: 26.9 MMOL/L (ref 22–29)
COLOR UR: ABNORMAL
CREAT BLD-MCNC: 0.69 MG/DL (ref 0.57–1)
DEPRECATED RDW RBC AUTO: 40.9 FL (ref 37–54)
EOSINOPHIL # BLD MANUAL: 0.07 10*3/MM3 (ref 0–0.4)
EOSINOPHIL NFR BLD MANUAL: 1 % (ref 0.3–6.2)
ERYTHROCYTE [DISTWIDTH] IN BLOOD BY AUTOMATED COUNT: 12.1 % (ref 12.3–15.4)
GFR SERPL CREATININE-BSD FRML MDRD: 104 ML/MIN/1.73
GLOBULIN UR ELPH-MCNC: 3.4 GM/DL
GLUCOSE BLD-MCNC: 97 MG/DL (ref 65–99)
GLUCOSE UR STRIP-MCNC: NEGATIVE MG/DL
HCT VFR BLD AUTO: 44.9 % (ref 34–46.6)
HGB BLD-MCNC: 14.9 G/DL (ref 12–15.9)
HGB UR QL STRIP.AUTO: NEGATIVE
HYALINE CASTS UR QL AUTO: NORMAL /LPF
KETONES UR QL STRIP: ABNORMAL
LEUKOCYTE ESTERASE UR QL STRIP.AUTO: NEGATIVE
LIPASE SERPL-CCNC: 19 U/L (ref 13–60)
LYMPHOCYTES # BLD MANUAL: 2.99 10*3/MM3 (ref 0.7–3.1)
LYMPHOCYTES NFR BLD MANUAL: 4 % (ref 5–12)
LYMPHOCYTES NFR BLD MANUAL: 42 % (ref 19.6–45.3)
MCH RBC QN AUTO: 30.3 PG (ref 26.6–33)
MCHC RBC AUTO-ENTMCNC: 33.2 G/DL (ref 31.5–35.7)
MCV RBC AUTO: 91.4 FL (ref 79–97)
MONOCYTES # BLD AUTO: 0.28 10*3/MM3 (ref 0.1–0.9)
NEUTROPHILS # BLD AUTO: 3.77 10*3/MM3 (ref 1.7–7)
NEUTROPHILS NFR BLD MANUAL: 53 % (ref 42.7–76)
NITRITE UR QL STRIP: NEGATIVE
PH UR STRIP.AUTO: 6 [PH] (ref 5–8)
PLAT MORPH BLD: NORMAL
PLATELET # BLD AUTO: 266 10*3/MM3 (ref 140–450)
PMV BLD AUTO: 11.7 FL (ref 6–12)
POTASSIUM BLD-SCNC: 3.7 MMOL/L (ref 3.5–5.2)
PROT SERPL-MCNC: 8.3 G/DL (ref 6–8.5)
PROT UR QL STRIP: ABNORMAL
RBC # BLD AUTO: 4.91 10*6/MM3 (ref 3.77–5.28)
RBC # UR: NORMAL /HPF
RBC MORPH BLD: NORMAL
REF LAB TEST METHOD: NORMAL
SODIUM BLD-SCNC: 138 MMOL/L (ref 136–145)
SP GR UR STRIP: 1.03 (ref 1–1.03)
SQUAMOUS #/AREA URNS HPF: NORMAL /HPF
UROBILINOGEN UR QL STRIP: ABNORMAL
WBC MORPH BLD: NORMAL
WBC NRBC COR # BLD: 7.12 10*3/MM3 (ref 3.4–10.8)
WBC UR QL AUTO: NORMAL /HPF

## 2019-11-20 PROCEDURE — 82150 ASSAY OF AMYLASE: CPT

## 2019-11-20 PROCEDURE — 36415 COLL VENOUS BLD VENIPUNCTURE: CPT

## 2019-11-20 PROCEDURE — 90674 CCIIV4 VAC NO PRSV 0.5 ML IM: CPT | Performed by: FAMILY MEDICINE

## 2019-11-20 PROCEDURE — 81001 URINALYSIS AUTO W/SCOPE: CPT | Performed by: FAMILY MEDICINE

## 2019-11-20 PROCEDURE — 83690 ASSAY OF LIPASE: CPT | Performed by: FAMILY MEDICINE

## 2019-11-20 PROCEDURE — 85007 BL SMEAR W/DIFF WBC COUNT: CPT | Performed by: FAMILY MEDICINE

## 2019-11-20 PROCEDURE — 80053 COMPREHEN METABOLIC PANEL: CPT | Performed by: FAMILY MEDICINE

## 2019-11-20 PROCEDURE — 85025 COMPLETE CBC W/AUTO DIFF WBC: CPT | Performed by: FAMILY MEDICINE

## 2019-11-20 PROCEDURE — 90471 IMMUNIZATION ADMIN: CPT | Performed by: FAMILY MEDICINE

## 2019-11-20 PROCEDURE — 99214 OFFICE O/P EST MOD 30 MIN: CPT | Performed by: FAMILY MEDICINE

## 2019-11-20 RX ORDER — OMEPRAZOLE 20 MG/1
20 CAPSULE, DELAYED RELEASE ORAL DAILY
Qty: 30 CAPSULE | Refills: 1 | Status: SHIPPED | OUTPATIENT
Start: 2019-11-20 | End: 2020-06-10

## 2019-11-20 NOTE — PROGRESS NOTES
Subjective   Swapna Vidales is a 23 y.o. female.     Abdominal Pain   This is a chronic problem. The current episode started more than 1 year ago. The onset quality is sudden. The problem occurs intermittently. The problem has been gradually worsening. The pain is located in the RUQ. The pain is moderate. The quality of the pain is dull. The abdominal pain does not radiate. Associated symptoms include constipation and a fever (99.2 to 100.4). Nothing aggravates the pain. The pain is relieved by nothing. She has tried acetaminophen for the symptoms. The treatment provided mild relief. Prior diagnostic workup includes upper endoscopy, ultrasound and GI consult (neg MIRTA).   Shoulder Injury    Incident location: feels numb. The quality of the pain is described as aching. The pain does not radiate. The symptoms are aggravated by movement. She has tried nothing for the symptoms. The treatment provided no relief.        The following portions of the patient's history were reviewed and updated as appropriate: allergies, current medications, past family history, past medical history, past social history, past surgical history and problem list.    Review of Systems   Constitutional: Positive for fever (99.2 to 100.4).   Gastrointestinal: Positive for abdominal pain and constipation.       Objective   Physical Exam   Constitutional: She is oriented to person, place, and time. She appears well-developed and well-nourished. No distress.   HENT:   Head: Normocephalic and atraumatic.   Cardiovascular: Normal rate and regular rhythm.   Pulmonary/Chest: Effort normal and breath sounds normal.   Abdominal: Soft. Normal appearance. She exhibits no mass. There is no hepatosplenomegaly. There is tenderness in the right upper quadrant. There is no rigidity, no rebound and no guarding.   Neurological: She is alert and oriented to person, place, and time.   Skin: Skin is warm and dry. She is not diaphoretic.   Psychiatric: She  "has a normal mood and affect. Her behavior is normal. Judgment and thought content normal.   Nursing note and vitals reviewed.    /80   Pulse 84   Ht 160 cm (63\")   Wt 58.1 kg (128 lb)   SpO2 100%   BMI 22.67 kg/m²     Assessment/Plan   Problems Addressed this Visit     None      Visit Diagnoses     Right upper quadrant abdominal pain    -  Primary    Relevant Orders    CT Abdomen Pelvis With Contrast    Comprehensive Metabolic Panel    CBC & Differential    Urinalysis With Microscopic - Urine, Clean Catch    Amylase    Lipase    Shoulder problem        Relevant Orders    XR Shoulder 2+ View Right    Ambulatory Referral to Orthopedic Surgery                 "

## 2019-11-21 ENCOUNTER — TELEPHONE (OUTPATIENT)
Dept: OBSTETRICS AND GYNECOLOGY | Facility: CLINIC | Age: 24
End: 2019-11-21

## 2019-11-21 NOTE — TELEPHONE ENCOUNTER
CALLED PT 11/20 TO INFORM HER THAT [facility] WOULD NOT BE IN OFFICE ON 11/21 SO WE WOULD NEED TO RESH HER APPT. PHONE WAS NOT ACTIVE. PT CAME IN TODAY FOR APPT, TOLD PT [facility] WAS NOT HERE AND THAT ALL OF OUR PROVIDERS ARE FULL FOR TODAY. PT STATES THAT SHE WILL HAVE TO CALL US BACK WHEN SHE IS ABLE TO COME BACK

## 2019-11-22 ENCOUNTER — TELEPHONE (OUTPATIENT)
Dept: FAMILY MEDICINE CLINIC | Facility: CLINIC | Age: 24
End: 2019-11-22

## 2019-11-22 DIAGNOSIS — R82.90 ABNORMAL FINDING ON URINALYSIS: Primary | ICD-10-CM

## 2019-11-22 DIAGNOSIS — E86.0 DEHYDRATION: ICD-10-CM

## 2019-11-22 NOTE — PROGRESS NOTES
Ketones in your urine otherwise okay.  I suspect she is not drinking enough water.  Have her increase fluid to 6 to 8 glasses of water a day and recheck urinalysis in 2 weeks

## 2019-11-22 NOTE — PROGRESS NOTES
Per Dr. Francisco, Ms. Vidales has been called with recent lab results & recommendations.  Continue current medications and follow-up as planned or sooner if any problems.    Lab ordered for 2 weeks Name band;

## 2019-11-22 NOTE — TELEPHONE ENCOUNTER
Per Dr. Francisco, Ms. Vidales has been called with recent lab results & recommendations.  Continue current medications and follow-up as planned or sooner if any problems.    Lab ordered for 2 weeks      ----- Message from Sony Francisco MD sent at 11/22/2019  1:47 PM CST -----  Ketones in your urine otherwise okay.  I suspect she is not drinking enough water.  Have her increase fluid to 6 to 8 glasses of water a day and recheck urinalysis in 2 weeks

## 2019-12-05 ENCOUNTER — TRANSCRIBE ORDERS (OUTPATIENT)
Dept: CT IMAGING | Facility: HOSPITAL | Age: 24
End: 2019-12-05

## 2019-12-05 DIAGNOSIS — R10.11 ABDOMINAL PAIN, RIGHT UPPER QUADRANT: Primary | ICD-10-CM

## 2019-12-09 ENCOUNTER — TELEPHONE (OUTPATIENT)
Dept: GENERAL RADIOLOGY | Facility: HOSPITAL | Age: 24
End: 2019-12-09

## 2019-12-09 NOTE — TELEPHONE ENCOUNTER
Patient was a no show for appointment on 15/09/19 at 8:00am for a Ct Abdomen Pelvis With contrast

## 2020-03-19 ENCOUNTER — TELEPHONE (OUTPATIENT)
Dept: FAMILY MEDICINE CLINIC | Facility: CLINIC | Age: 25
End: 2020-03-19

## 2020-03-19 RX ORDER — AMOXICILLIN 500 MG/1
500 TABLET, FILM COATED ORAL 3 TIMES DAILY
Qty: 21 TABLET | Refills: 0 | Status: SHIPPED | OUTPATIENT
Start: 2020-03-19 | End: 2020-03-26

## 2020-03-19 NOTE — TELEPHONE ENCOUNTER
Spoke with patient, she has not had any fever or sob, she has sinus drainage, irritated throat and cough, small amount of wheezing.  Recommended that if she starts running a fever over 100.4 and/or has shortness of breath to call either ER or Urgent Care and let them know before going in.  Pt verbalized understanding.  She uses [facility] in [place].

## 2020-03-19 NOTE — TELEPHONE ENCOUNTER
Swapna called and said she has a cold, has had for about 3 days and hanging around, she has had no fever, has a cough, scratchy throat, drainage and wheezing on her right side chest.  Asked if Dr Francisco could order her something to [facility] in [place] or if he will see her?

## 2020-06-10 ENCOUNTER — OFFICE VISIT (OUTPATIENT)
Dept: FAMILY MEDICINE CLINIC | Facility: CLINIC | Age: 25
End: 2020-06-10

## 2020-06-10 VITALS — WEIGHT: 129.9 LBS | BODY MASS INDEX: 23.9 KG/M2 | HEIGHT: 62 IN

## 2020-06-10 DIAGNOSIS — H92.01 RIGHT EAR PAIN: ICD-10-CM

## 2020-06-10 DIAGNOSIS — M54.2 NECK PAIN: Primary | ICD-10-CM

## 2020-06-10 DIAGNOSIS — R20.2 TINGLING: ICD-10-CM

## 2020-06-10 DIAGNOSIS — R22.0 FACIAL SWELLING: ICD-10-CM

## 2020-06-10 PROCEDURE — 96372 THER/PROPH/DIAG INJ SC/IM: CPT | Performed by: FAMILY MEDICINE

## 2020-06-10 PROCEDURE — 99213 OFFICE O/P EST LOW 20 MIN: CPT | Performed by: FAMILY MEDICINE

## 2020-06-10 RX ORDER — TRIAMCINOLONE ACETONIDE 40 MG/ML
80 INJECTION, SUSPENSION INTRA-ARTICULAR; INTRAMUSCULAR ONCE
Status: COMPLETED | OUTPATIENT
Start: 2020-06-10 | End: 2020-06-10

## 2020-06-10 RX ORDER — MELOXICAM 15 MG/1
15 TABLET ORAL DAILY
Qty: 30 TABLET | Refills: 1 | Status: SHIPPED | OUTPATIENT
Start: 2020-06-10 | End: 2020-07-08

## 2020-06-10 RX ORDER — CYCLOBENZAPRINE HCL 5 MG
5 TABLET ORAL 3 TIMES DAILY
Qty: 30 TABLET | Refills: 1 | Status: SHIPPED | OUTPATIENT
Start: 2020-06-10 | End: 2020-07-08

## 2020-06-10 RX ADMIN — TRIAMCINOLONE ACETONIDE 80 MG: 40 INJECTION, SUSPENSION INTRA-ARTICULAR; INTRAMUSCULAR at 15:20

## 2020-06-10 NOTE — PROGRESS NOTES
Subjective   Swapna Vidales is a 24 y.o. female.     Pain   This is a chronic problem. The current episode started more than 1 month ago. The problem occurs intermittently. The problem has been waxing and waning. Associated symptoms include arthralgias, myalgias and neck pain. Pertinent negatives include no joint swelling. The symptoms are aggravated by exertion. She has tried acetaminophen and NSAIDs for the symptoms. The treatment provided no relief.   Shoulder Injury           The following portions of the patient's history were reviewed and updated as appropriate: allergies, current medications, past family history, past medical history, past social history, past surgical history and problem list.    Review of Systems   HENT: Positive for ear pain. Negative for ear discharge and postnasal drip.    Eyes: Negative for discharge and visual disturbance.   Musculoskeletal: Positive for arthralgias, myalgias, neck pain and neck stiffness. Negative for joint swelling.   Neurological: Negative for dizziness, seizures, syncope and light-headedness.       Objective   Physical Exam   Constitutional: She is oriented to person, place, and time. She appears well-developed and well-nourished. No distress.   HENT:   Head: Normocephalic and atraumatic.   Right Ear: Hearing and tympanic membrane normal.   Left Ear: Hearing and tympanic membrane normal.   Eyes: Pupils are equal, round, and reactive to light. Conjunctivae and EOM are normal.   Neck: Full passive range of motion without pain. Neck supple. No spinous process tenderness and no muscular tenderness present. No neck rigidity. No edema, no erythema and normal range of motion present.       Musculoskeletal:        Cervical back: She exhibits tenderness, pain and spasm. She exhibits normal range of motion, no bony tenderness, no swelling, no edema and no deformity.   Neurological: She is alert and oriented to person, place, and time. No cranial nerve deficit.    Reflex Scores:       Bicep reflexes are 2+ on the right side and 2+ on the left side.       Patellar reflexes are 2+ on the right side and 2+ on the left side.  Skin: Skin is warm and dry. She is not diaphoretic.   Psychiatric: She has a normal mood and affect. Her behavior is normal. Judgment and thought content normal.   Nursing note and vitals reviewed.      Assessment/Plan   Problems Addressed this Visit     None      Visit Diagnoses     Neck pain    -  Primary    Relevant Medications    triamcinolone acetonide (KENALOG-40) injection 80 mg    Facial swelling        Tingling        Right ear pain              She has pain on the right side of her body also swelling on the right side of the body.  I can see the swelling right side of her face.  Neurologically everything is intact she is tender over right mastoid process.  She does have some spasm in her neck muscles.  Unable to get a focal neurological deficit noted today.  She does have some tingling on her right side 2.  At this point we will treat her conservatively with anti-inflammatories cortisone injection and muscle relaxers.  Follow-up 1 month 1 month for recheck

## 2020-07-08 ENCOUNTER — OFFICE VISIT (OUTPATIENT)
Dept: FAMILY MEDICINE CLINIC | Facility: CLINIC | Age: 25
End: 2020-07-08

## 2020-07-08 VITALS
SYSTOLIC BLOOD PRESSURE: 120 MMHG | BODY MASS INDEX: 23.92 KG/M2 | DIASTOLIC BLOOD PRESSURE: 80 MMHG | HEART RATE: 102 BPM | HEIGHT: 62 IN | WEIGHT: 130 LBS | OXYGEN SATURATION: 98 %

## 2020-07-08 DIAGNOSIS — R29.898 RIGHT ARM WEAKNESS: ICD-10-CM

## 2020-07-08 DIAGNOSIS — R25.1 TREMOR: Primary | ICD-10-CM

## 2020-07-08 PROCEDURE — 99214 OFFICE O/P EST MOD 30 MIN: CPT | Performed by: FAMILY MEDICINE

## 2020-07-08 NOTE — PROGRESS NOTES
Subjective   Swapna Vidales is a 24 y.o. female.     Pain   This is a chronic problem. The current episode started more than 1 month ago. The problem occurs intermittently. The problem has been waxing and waning. Associated symptoms include arthralgias, myalgias, neck pain and numbness. Pertinent negatives include no joint swelling or swollen glands. The symptoms are aggravated by exertion. She has tried acetaminophen and NSAIDs for the symptoms. The treatment provided no relief.   Shaking   This is a new problem. The current episode started 1 to 4 weeks ago. The problem occurs intermittently. The problem has been unchanged. Associated symptoms include arthralgias, myalgias, neck pain and numbness. Pertinent negatives include no joint swelling or swollen glands.        The following portions of the patient's history were reviewed and updated as appropriate: allergies, current medications, past family history, past medical history, past social history, past surgical history and problem list.    Review of Systems   HENT: Positive for ear pain. Negative for ear discharge and postnasal drip.    Eyes: Negative for discharge and visual disturbance.   Musculoskeletal: Positive for arthralgias, myalgias, neck pain and neck stiffness. Negative for joint swelling.   Neurological: Positive for numbness. Negative for dizziness, seizures, syncope and light-headedness.       Objective   Physical Exam   Constitutional: She is oriented to person, place, and time. She appears well-developed and well-nourished. No distress.   HENT:   Head: Normocephalic and atraumatic.   Right Ear: Hearing and tympanic membrane normal.   Left Ear: Hearing and tympanic membrane normal.   Eyes: Pupils are equal, round, and reactive to light. Conjunctivae and EOM are normal.   Neck: Full passive range of motion without pain. Neck supple. No spinous process tenderness and no muscular tenderness present. No neck rigidity. No edema, no erythema  and normal range of motion present.       Musculoskeletal:        Cervical back: She exhibits tenderness, pain and spasm. She exhibits normal range of motion, no bony tenderness, no swelling, no edema and no deformity.   Lymphadenopathy:     She has no cervical adenopathy.        Right cervical: No superficial cervical, no deep cervical and no posterior cervical adenopathy present.       Left cervical: No superficial cervical and no deep cervical adenopathy present.   Neurological: She is alert and oriented to person, place, and time. No cranial nerve deficit or sensory deficit.   Reflex Scores:       Bicep reflexes are 2+ on the right side and 2+ on the left side.       Patellar reflexes are 2+ on the right side and 2+ on the left side.  Skin: Skin is warm and dry. She is not diaphoretic.   Psychiatric: She has a normal mood and affect. Her behavior is normal. Judgment and thought content normal.   Nursing note and vitals reviewed.      Assessment/Plan   Problems Addressed this Visit     None      Visit Diagnoses     Tremor    -  Primary    Relevant Orders    Ambulatory Referral to Neurology (Completed)    Right arm weakness        Relevant Orders    Ambulatory Referral to Neurology (Completed)               She continues to have some swelling on the right side of the face and at rest her right shoulder is asymmetrically lower than her left shoulder.  She does have a little tremor in her right index finger but is not constantly there.  No pronator drift was noted and normal shoulder shrug noted today.  Unable to find the name specific adenopathy today but she does have some tension in her right posterior neck muscle mass.  Previous treatment with steroid injection anti-inflammatories and muscle relaxers did help some but did not resolve the problem.  She is out as she is pregnant stopped all medicines.  At this point I do not have a diagnosis but will refer to neurology for tremor and writes sided numbness tingling  and weakness.

## 2020-07-08 NOTE — PROGRESS NOTES
Subjective   Swapna Vidales is a 24 y.o. female.     Pain   This is a chronic problem. The current episode started more than 1 month ago. The problem occurs intermittently. The problem has been waxing and waning. Associated symptoms include arthralgias, myalgias and neck pain. Pertinent negatives include no joint swelling. The symptoms are aggravated by exertion. She has tried acetaminophen and NSAIDs for the symptoms. The treatment provided no relief.   Shoulder Injury           The following portions of the patient's history were reviewed and updated as appropriate: allergies, current medications, past family history, past medical history, past social history, past surgical history and problem list.    Review of Systems   HENT: Positive for ear pain. Negative for ear discharge and postnasal drip.    Eyes: Negative for discharge and visual disturbance.   Musculoskeletal: Positive for arthralgias, myalgias, neck pain and neck stiffness. Negative for joint swelling.   Neurological: Negative for dizziness, seizures, syncope and light-headedness.       Objective   Physical Exam   Constitutional: She is oriented to person, place, and time. She appears well-developed and well-nourished. No distress.   HENT:   Head: Normocephalic and atraumatic.   Right Ear: Hearing and tympanic membrane normal.   Left Ear: Hearing and tympanic membrane normal.   Eyes: Pupils are equal, round, and reactive to light. Conjunctivae and EOM are normal.   Neck: Full passive range of motion without pain. Neck supple. No spinous process tenderness and no muscular tenderness present. No neck rigidity. No edema, no erythema and normal range of motion present.       Musculoskeletal:        Cervical back: She exhibits tenderness, pain and spasm. She exhibits normal range of motion, no bony tenderness, no swelling, no edema and no deformity.   Neurological: She is alert and oriented to person, place, and time. No cranial nerve deficit.    Reflex Scores:       Bicep reflexes are 2+ on the right side and 2+ on the left side.       Patellar reflexes are 2+ on the right side and 2+ on the left side.  Skin: Skin is warm and dry. She is not diaphoretic.   Psychiatric: She has a normal mood and affect. Her behavior is normal. Judgment and thought content normal.   Nursing note and vitals reviewed.      Assessment/Plan   Problems Addressed this Visit     None          She has pain on the right side of her body also swelling on the right side of the body.  I can see the swelling right side of her face.  Neurologically everything is intact she is tender over right mastoid process.  She does have some spasm in her neck muscles.  Unable to get a focal neurological deficit noted today.  She does have some tingling on her right side 2.  At this point we will treat her conservatively with anti-inflammatories cortisone injection and muscle relaxers.  Follow-up 1 month 1 month for recheck

## 2020-07-21 ENCOUNTER — INITIAL PRENATAL (OUTPATIENT)
Dept: OBSTETRICS AND GYNECOLOGY | Facility: CLINIC | Age: 25
End: 2020-07-21

## 2020-07-21 ENCOUNTER — LAB (OUTPATIENT)
Dept: LAB | Facility: HOSPITAL | Age: 25
End: 2020-07-21

## 2020-07-21 VITALS — BODY MASS INDEX: 23.78 KG/M2 | WEIGHT: 130 LBS | DIASTOLIC BLOOD PRESSURE: 60 MMHG | SYSTOLIC BLOOD PRESSURE: 118 MMHG

## 2020-07-21 DIAGNOSIS — O21.9 NAUSEA AND VOMITING DURING PREGNANCY PRIOR TO 22 WEEKS GESTATION: ICD-10-CM

## 2020-07-21 DIAGNOSIS — O98.311 GENITAL HERPES AFFECTING PREGNANCY IN FIRST TRIMESTER: ICD-10-CM

## 2020-07-21 DIAGNOSIS — A60.09 GENITAL HERPES AFFECTING PREGNANCY IN FIRST TRIMESTER: ICD-10-CM

## 2020-07-21 DIAGNOSIS — Z12.4 ENCOUNTER FOR PAPANICOLAOU SMEAR FOR CERVICAL CANCER SCREENING: ICD-10-CM

## 2020-07-21 DIAGNOSIS — F12.11 MARIJUANA ABUSE IN REMISSION: ICD-10-CM

## 2020-07-21 DIAGNOSIS — R82.90 ABNORMAL FINDING ON URINALYSIS: ICD-10-CM

## 2020-07-21 DIAGNOSIS — Z34.90 PREGNANCY WITH FETUS OF UNKNOWN GESTATIONAL AGE: ICD-10-CM

## 2020-07-21 DIAGNOSIS — Z32.00 PREGNANCY EXAMINATION OR TEST, PREGNANCY UNCONFIRMED: ICD-10-CM

## 2020-07-21 DIAGNOSIS — E86.0 DEHYDRATION: ICD-10-CM

## 2020-07-21 DIAGNOSIS — M54.2 NECK PAIN: ICD-10-CM

## 2020-07-21 DIAGNOSIS — Z34.91 INITIAL OBSTETRIC VISIT IN FIRST TRIMESTER: Primary | ICD-10-CM

## 2020-07-21 DIAGNOSIS — Z34.80 SUPERVISION OF OTHER NORMAL PREGNANCY: Primary | ICD-10-CM

## 2020-07-21 PROBLEM — Z30.015 ENCOUNTER FOR INITIAL PRESCRIPTION OF VAGINAL RING HORMONAL CONTRACEPTIVE: Status: RESOLVED | Noted: 2019-08-29 | Resolved: 2020-07-21

## 2020-07-21 PROBLEM — Z30.431 SURVEILLANCE OF (INTRAUTERINE) CONTRACEPTIVE DEVICE: Status: RESOLVED | Noted: 2018-10-03 | Resolved: 2020-07-21

## 2020-07-21 LAB
ABO GROUP BLD: NORMAL
AMPHET+METHAMPHET UR QL: NEGATIVE
AMPHETAMINES UR QL: NEGATIVE
B-HCG UR QL: POSITIVE
BACTERIA UR QL AUTO: NORMAL /HPF
BARBITURATES UR QL SCN: NEGATIVE
BASOPHILS # BLD AUTO: 0.04 10*3/MM3 (ref 0–0.2)
BASOPHILS NFR BLD AUTO: 0.5 % (ref 0–1.5)
BENZODIAZ UR QL SCN: NEGATIVE
BILIRUB UR QL STRIP: NEGATIVE
BLD GP AB SCN SERPL QL: NEGATIVE
BUPRENORPHINE SERPL-MCNC: NEGATIVE NG/ML
CANNABINOIDS SERPL QL: POSITIVE
CLARITY UR: CLEAR
COCAINE UR QL: NEGATIVE
COLOR UR: YELLOW
DEPRECATED RDW RBC AUTO: 42.8 FL (ref 37–54)
EOSINOPHIL # BLD AUTO: 0.06 10*3/MM3 (ref 0–0.4)
EOSINOPHIL NFR BLD AUTO: 0.7 % (ref 0.3–6.2)
ERYTHROCYTE [DISTWIDTH] IN BLOOD BY AUTOMATED COUNT: 12.4 % (ref 12.3–15.4)
GLUCOSE UR STRIP-MCNC: NEGATIVE MG/DL
HBV SURFACE AG SERPL QL IA: NORMAL
HCT VFR BLD AUTO: 40.6 % (ref 34–46.6)
HCV AB SER DONR QL: NORMAL
HGB BLD-MCNC: 13.7 G/DL (ref 12–15.9)
HGB UR QL STRIP.AUTO: NEGATIVE
HIV1+2 AB SER QL: NORMAL
HOLD SPECIMEN: NORMAL
HYALINE CASTS UR QL AUTO: NORMAL /LPF
IMM GRANULOCYTES # BLD AUTO: 0.02 10*3/MM3 (ref 0–0.05)
IMM GRANULOCYTES NFR BLD AUTO: 0.2 % (ref 0–0.5)
INTERNAL NEGATIVE CONTROL: NEGATIVE
INTERNAL POSITIVE CONTROL: POSITIVE
KETONES UR QL STRIP: NEGATIVE
LEUKOCYTE ESTERASE UR QL STRIP.AUTO: NEGATIVE
LYMPHOCYTES # BLD AUTO: 1.72 10*3/MM3 (ref 0.7–3.1)
LYMPHOCYTES NFR BLD AUTO: 19.8 % (ref 19.6–45.3)
Lab: ABNORMAL
Lab: NORMAL
MCH RBC QN AUTO: 31.4 PG (ref 26.6–33)
MCHC RBC AUTO-ENTMCNC: 33.7 G/DL (ref 31.5–35.7)
MCV RBC AUTO: 92.9 FL (ref 79–97)
METHADONE UR QL SCN: NEGATIVE
MONOCYTES # BLD AUTO: 0.48 10*3/MM3 (ref 0.1–0.9)
MONOCYTES NFR BLD AUTO: 5.5 % (ref 5–12)
NEUTROPHILS NFR BLD AUTO: 6.36 10*3/MM3 (ref 1.7–7)
NEUTROPHILS NFR BLD AUTO: 73.3 % (ref 42.7–76)
NITRITE UR QL STRIP: NEGATIVE
NRBC BLD AUTO-RTO: 0 /100 WBC (ref 0–0.2)
OPIATES UR QL: NEGATIVE
OXYCODONE UR QL SCN: NEGATIVE
PCP UR QL SCN: NEGATIVE
PH UR STRIP.AUTO: 8.5 [PH] (ref 5–8)
PLATELET # BLD AUTO: 119 10*3/MM3 (ref 140–450)
PMV BLD AUTO: 12 FL (ref 6–12)
PROPOXYPH UR QL: NEGATIVE
PROT UR QL STRIP: NEGATIVE
RBC # BLD AUTO: 4.37 10*6/MM3 (ref 3.77–5.28)
RBC # UR: NORMAL /HPF
REF LAB TEST METHOD: NORMAL
RH BLD: POSITIVE
RPR SER QL: NORMAL
RUBV IGG SERPL IA-ACNC: POSITIVE
SP GR UR STRIP: 1.02 (ref 1–1.03)
SQUAMOUS #/AREA URNS HPF: NORMAL /HPF
TRICYCLICS UR QL SCN: NEGATIVE
UROBILINOGEN UR QL STRIP: ABNORMAL
WBC # BLD AUTO: 8.68 10*3/MM3 (ref 3.4–10.8)
WBC UR QL AUTO: NORMAL /HPF

## 2020-07-21 PROCEDURE — 87491 CHLMYD TRACH DNA AMP PROBE: CPT | Performed by: NURSE PRACTITIONER

## 2020-07-21 PROCEDURE — 99214 OFFICE O/P EST MOD 30 MIN: CPT | Performed by: NURSE PRACTITIONER

## 2020-07-21 PROCEDURE — 80306 DRUG TEST PRSMV INSTRMNT: CPT | Performed by: NURSE PRACTITIONER

## 2020-07-21 PROCEDURE — 81025 URINE PREGNANCY TEST: CPT | Performed by: NURSE PRACTITIONER

## 2020-07-21 PROCEDURE — 81001 URINALYSIS AUTO W/SCOPE: CPT

## 2020-07-21 PROCEDURE — 87661 TRICHOMONAS VAGINALIS AMPLIF: CPT | Performed by: NURSE PRACTITIONER

## 2020-07-21 PROCEDURE — 86803 HEPATITIS C AB TEST: CPT | Performed by: NURSE PRACTITIONER

## 2020-07-21 PROCEDURE — 80081 OBSTETRIC PANEL INC HIV TSTG: CPT | Performed by: NURSE PRACTITIONER

## 2020-07-21 PROCEDURE — 87591 N.GONORRHOEAE DNA AMP PROB: CPT | Performed by: NURSE PRACTITIONER

## 2020-07-21 PROCEDURE — 36415 COLL VENOUS BLD VENIPUNCTURE: CPT

## 2020-07-21 PROCEDURE — 87086 URINE CULTURE/COLONY COUNT: CPT | Performed by: NURSE PRACTITIONER

## 2020-07-21 RX ORDER — DIPHENHYDRAMINE HYDROCHLORIDE 25 MG/1
25 CAPSULE ORAL DAILY
Qty: 90 TABLET | Refills: 3 | Status: SHIPPED | OUTPATIENT
Start: 2020-07-21 | End: 2021-03-15

## 2020-07-21 RX ORDER — PRENATAL VIT/IRON FUM/FOLIC AC 27MG-0.8MG
1 TABLET ORAL DAILY
COMMUNITY
End: 2021-06-08

## 2020-07-21 NOTE — PROGRESS NOTES
I spent approximately 45 minutes with the patient acquiring the health and history intake and discussing topics related to healthy lifestyle. This is her 4th pregnancy. Her LMP is approximately 5/20/20. She has had 1 miscarriage and 2 vaginal deliveries. A newob bag is given. The 1st trimester teaching was done with the patient. We discussed a healthy diet and exercise and what is recommended. I also discussed Listeriosis and Toxoplasmosis and what fish to avoid due to high mercury levels.  Informed patient not to be in hot tubs, saunas, or tanning beds. We discussed that spotting may occur after intercourse which is common, but if heavy bleeding like a period occurs to call [facility] or hospital if clinic is closed.  I encouraged her to make an appointment with the dentist if she has not had a dental exam and cleaning in the last 6 months. I instructed the patient that alcohol, illicit drug use, and tobacco smoking should be avoided in pregnancy. The patient does not smoke cigarettes but discussed the importance of avoiding second hand smoke. However, the patient does tell me she smokes marijuana, but she plans to quit smoking. We discussed the hospital policy procedure if a patient has a positive urine drug screen at the time of admission to the hospital. She plans to breastfeed. I gave her pamphlet on breastfeeding classes and the breastfeeding mothers support group that is provided by Caro Sanchez, Lactation Consultant. She filled out the health department referral form and depression screening questionnaire. I informed patient that group prenatal education classes are still offered here. She has done Centering with a past pregnancy.  I instructed patient to let the provider know if she would like to join a group after EDC is established. I discussed with the patient that a pediatrician needs to be chosen prior to delivery for the infant to have an appointment scheduled before leaving the hospital. The  patient plans to use same pediatrician who her other children see.  I discussed lab tests will be done today. Her last pap smear was 7/2017.   I encouraged the patient to get the TDAP vaccine in the 3rd trimester.  All questions were answered at this time.

## 2020-07-21 NOTE — PROGRESS NOTES
[facility]  Obstetrics Visit    CHIEF COMPLAINT:  New prenatal visit    HISTORY OF PRESENT ILLNESS:  Swapna Vidales is a 24 y.o. y/o  at Unknown by LMP (No LMP recorded. Patient is pregnant.).  This was a planned pregnancy and the patient is supported by spouse.  Reports nausea with vomiting.  Denies breast tenderness.  She denies any vaginal bleeding.  She has started taking a prenatal vitamin.    REVIEW OF SYSTEMS  Review of Systems   Constitutional: Negative for chills, fatigue, fever, unexpected weight gain and unexpected weight loss.   Respiratory: Negative for shortness of breath.    Cardiovascular: Negative for chest pain and palpitations.   Gastrointestinal: Positive for nausea and vomiting. Negative for abdominal pain, constipation and diarrhea.   Genitourinary: Negative for breast discharge, breast lump, breast pain, difficulty urinating, dysuria, frequency, urinary incontinence, vaginal bleeding, vaginal discharge and vaginal pain.   Skin: Negative for rash.   Neurological: Negative for weakness and headache.   Psychiatric/Behavioral: Negative for sleep disturbance, depressed mood and stress.       PRENATAL RISK FACTORS   Problems (from 20 to present)     Problem Noted Resolved    Genital herpes affecting pregnancy in first trimester 2020 by Lawanda Steve APRN No    Overview Signed 2020 11:52 AM by Lawanda Steve APRN     Supression at 35 weeks.          Neck pain 2020 by Lawanda Steve APRN No    Overview Addendum 2020 12:05 PM by Lawanda Steve APRN     Previously on Kenalog injection. Declines flexeril. Discussed physical therapy; pt declines.          Nausea and vomiting during pregnancy prior to 22 weeks gestation 2020 by Lawanda Steve APRN No    Overview Signed 2020 11:55 AM by Lawanda Steve APRN     Vitamin B6 and Unisom rx.         Marijuana abuse in remission 2020 by Power  ADNIEL Webber No    Overview Signed 2020 12:03 PM by Lawanda Steve APRN     Counseling provided; plants to stop.                DATING CRITERIA:  LMP (2020) approximate  1TUS-pending    OBSTETRIC HISTORY:  OB History    Para Term  AB Living   4 2 2 0 1 2   SAB TAB Ectopic Molar Multiple Live Births   1 0 0   0 2      # Outcome Date GA Lbr [place]/2nd Weight Sex Delivery Anes PTL Lv   4 Current            3 Term 17 40w4d 05:59 / 01:11 3374 g (7 lb 7 oz) M Vag-Spont None N VINCENT   2 SAB 2016           1 Term 13 39w3d  2580 g (5 lb 11 oz) F Vag-Spont OTHER N VINCENT      Birth Comments: IV pain medicine      GYN HISTORY:  Denies h/o sexually transmitted infections/pelvic inflammatory disease  Denies h/o abnormal pap smears  Last pap smear:   Last Completed Pap Smear       Status Date      PAP SMEAR Done 2017 LIQUID-BASED PAP SMEAR, SCREENING        Denies h/o gynecologic surgeries, including biopsies of the cervix    PAST MEDICAL HISTORY:  Past Medical History:   Diagnosis Date   • Abdominal pain    • Acute maxillary sinusitis    • Acute pharyngitis    • Acute sinusitis    • Acute tonsillitis    • Acute upper respiratory infection    • Allergic rhinitis    • Anxiety    • Bacterial vaginosis    • Bronchitis    • Conjunctivitis    • Cystitis    • Diarrhea    • Encounter for general counseling and advice on contraceptive management    • Encounter for  visit      visit status     • Encounters for administrative purposes     Exam, medical, administrative    • Gastroenteritis    • Genital herpes simplex    • Headache    • History of postpartum depression    • Increased frequency of urination    • Influenza    • Irregular menses    • Irregular menstruation     unspecified     • Nausea and vomiting    • Otitis media    • Ovarian cyst    • Patient currently pregnant    • Pelvic pain     C/O pelvic pain    • Respiratory syncytial virus infection     three months of age      • Routine postpartum follow-up    • Streptococcal sore throat    • Substance abuse (CMS/HCC)     MARIJUANA   • Upper respiratory infection    • Varicella      PAST SURGICAL HISTORY:  Past Surgical History:   Procedure Laterality Date   • COLONOSCOPY N/A 8/24/2018    Procedure: COLONOSCOPY;  Surgeon: Jimmie Reilly MD;  Location: [facility] ENDOSCOPY;  Service: Gastroenterology   • ENDOSCOPY N/A 8/24/2018    Procedure: ESOPHAGOGASTRODUODENOSCOPY;  Surgeon: Jimmie Reilly MD;  Location: [facility] ENDOSCOPY;  Service: Gastroenterology   • VAGINAL DELIVERY  09/04/2013    Spontaneous vaginal delivery at term.   • WISDOM TOOTH EXTRACTION       FAMILY HISTORY:  Family History   Problem Relation Age of Onset   • Hyperlipidemia Father         High Cholesterol   • Heart block Father    • Diabetes Father    • Hypertension Father    • Ovarian cysts Sister    • Thyroid disease Mother         hypothyroidism   • Hypertension Mother    • Hyperlipidemia Mother    • Heart disease Other    • Hypertension Other    • Diabetes Other    • No Known Problems Son    • No Known Problems Daughter    • No Known Problems Brother      SOCIAL HISTORY:  Social History     Socioeconomic History   • Marital status:      Spouse name: Eb   • Number of children: 2   • Years of education: Not on file   • Highest education level: Not on file   Occupational History   • Occupation: Babysitting     Comment: Patient resides with  and 2 children   Tobacco Use   • Smoking status: Never Smoker   • Smokeless tobacco: Never Used   Substance and Sexual Activity   • Alcohol use: No   • Drug use: Not Currently     Types: Marijuana   • Sexual activity: Yes     Comment: LAST PAP SMEAR 7/18/17 NEGATIVE      GENETIC SCREENING:  Age >34 yo as of NAKIA: No  Thalassemia: No  NTD: No  CHD: No  Down Syndrome/MR/Fragile X/Autism: No  Ashkenazi Buddhist with Federico-Sachs, [place], familial dysautonomia: No  Sickle cell disease or trait: No  Hemophilia:  No    Muscular dystrophy: No  Cystic fibrosis: No  Plaquemines's chorea: No  Birth defects: No  Genetic/chromosomal disorders: No    INFECTION HISTORY:  TB exposure: No  HSV: YES   Illness since LMP: No  Prior GBS infected child: No  STIs: No    ALLERGIES:  No Known Allergies    MEDICATIONS:  Prior to Admission medications    Medication Sig Start Date End Date Taking? Authorizing Provider   albuterol sulfate  (90 Base) MCG/ACT inhaler Inhale 2 puffs Every 4 (Four) Hours As Needed for Wheezing. 5/29/19   Noemí Reynolds APRN   cetirizine (zyrTEC) 10 MG tablet Take 10 mg by mouth Daily.    Emergency, Nurse Escobar, RN   doxylamine (UNISOM) 25 MG tablet Take 0.5 tablets by mouth At Night As Needed for Sleep or Nausea. 7/21/20   Lawanda Steve APRN   prenatal vitamin (prenatal, CLASSIC, vitamin) tablet Take 1 tablet by mouth Daily.    Provider, MD Moris   vitamin B-6 (PYRIDOXINE) 25 MG tablet Take 1 tablet by mouth Daily. 7/21/20   Lawanda Steve APRN       PHYSICAL EXAM:   There were no vitals taken for this visit.  General: Alert, healthy, no distress, well nourished and well developed.  Neurologic: Alert, oriented to person, place, and time.  Gait normal.  Cranial nerves II-XII grossly intact.  HEENT: Normocephalic, atraumatic.  Extraocular muscles intact, pupils equal and reactive x2.    Teeth: Normal hygiene.  Neck: Supple, no adenopathy, thyroid normal size, non-tender, without nodularity, trachea midline.  Breasts: Deferred  Lungs: Normal respiratory effort.  Clear to auscultation bilaterally.  No wheezes, rhonci, or rales.  Heart: Regular rate and rhythm.  No murmer, rub or gallop.  Abdomen: Soft, non-tender, non-distended,no masses, no hepatosplenomegaly, no hernia.  Skin: No rash, no lesions.  Extremities: No cyanosis, clubbing or edema.  PELVIC EXAM:  External Genitalia/Vulva: Anatomy is normal, no significant redness of labia, no discharge on vulvar tissues, Newsoms's and  Bartholin's glands are normal, no ulcers, no condylomatous lesions.  Urethra: Normal, no lesions.  Vagina: Vaginal tissues are not inflamed, normal color and texture, no significant discharge present.  Cervix: Normal in appearance without lesions or purulent discharge, no cervical motion tenderness.  Uterus: Normal size, shape, and consistency.  Adnexa: Normal size and shape bilaterally, no palpable mass bilaterally and non-tender bilaterally.   PAP test obtained.     Bedside ultrasound performed by myself which shows the findings below: early IUP, apx. 7 weeks. Cardiac activity visualized.       IMPRESSION:  Swapna Vidales is a 24 y.o.  at Unknown for a new prenatal visit.    PLAN:  1.  IUP at unknown EGA  - Options counseling performed and patient desires continuation of pregnancy to term   - Prenatal labs ordered  - Genetic testing, including cystic fibrosis, was discussed and patient declines NIPS & carrier screening  - Continue prenatal vitamins  - Weight gain counseling performed.   - [place] BMI 18.5-24.9: Recommend 25-35 lb   - Dating ultrasound pending   - Return to clinic in 4 weeks for return prenatal visit  - Reviewed COVID-19 visitation policy  - Reviewed COVID-19 precautions     Diagnosis Plan   1. Initial obstetric visit in first trimester     2. Encounter for Papanicolaou smear for cervical cancer screening  Liquid-based Pap Smear, Screening   3. Pregnancy with fetus of unknown gestational age  US Ob < 14 Weeks Single or First Gestation   4. Genital herpes affecting pregnancy in first trimester     5. Neck pain  Discuss muscle relaxants, physical therapy; pt declined   6. Nausea and vomiting during pregnancy prior to 22 weeks gestation  Vitamin B6 and Unisom rx     Lawanda Louise, DANIEL  2020  12:07

## 2020-07-22 LAB
BACTERIA SPEC AEROBE CULT: NO GROWTH
C TRACH RRNA CVX QL NAA+PROBE: NEGATIVE
N GONORRHOEA RRNA SPEC QL NAA+PROBE: NEGATIVE
TRICHOMONAS VAGINALIS PCR: NEGATIVE

## 2020-07-27 LAB
LAB AP CASE REPORT: NORMAL
PATH INTERP SPEC-IMP: NORMAL

## 2020-08-11 DIAGNOSIS — Z34.90 PREGNANCY WITH FETUS OF UNKNOWN GESTATIONAL AGE: ICD-10-CM

## 2020-08-18 ENCOUNTER — ROUTINE PRENATAL (OUTPATIENT)
Dept: OBSTETRICS AND GYNECOLOGY | Facility: CLINIC | Age: 25
End: 2020-08-18

## 2020-08-18 VITALS — DIASTOLIC BLOOD PRESSURE: 68 MMHG | WEIGHT: 135.2 LBS | BODY MASS INDEX: 24.73 KG/M2 | SYSTOLIC BLOOD PRESSURE: 112 MMHG

## 2020-08-18 DIAGNOSIS — F12.11 MARIJUANA ABUSE IN REMISSION: ICD-10-CM

## 2020-08-18 DIAGNOSIS — O21.9 NAUSEA AND VOMITING DURING PREGNANCY PRIOR TO 22 WEEKS GESTATION: ICD-10-CM

## 2020-08-18 DIAGNOSIS — Z34.81 ENCOUNTER FOR SUPERVISION OF OTHER NORMAL PREGNANCY IN FIRST TRIMESTER: ICD-10-CM

## 2020-08-18 DIAGNOSIS — A60.09 GENITAL HERPES AFFECTING PREGNANCY IN FIRST TRIMESTER: ICD-10-CM

## 2020-08-18 DIAGNOSIS — D69.6 THROMBOCYTOPENIA AFFECTING PREGNANCY (HCC): ICD-10-CM

## 2020-08-18 DIAGNOSIS — O99.119 THROMBOCYTOPENIA AFFECTING PREGNANCY (HCC): ICD-10-CM

## 2020-08-18 DIAGNOSIS — O98.311 GENITAL HERPES AFFECTING PREGNANCY IN FIRST TRIMESTER: ICD-10-CM

## 2020-08-18 DIAGNOSIS — Z3A.11 11 WEEKS GESTATION OF PREGNANCY: Primary | ICD-10-CM

## 2020-08-18 DIAGNOSIS — M54.2 NECK PAIN: ICD-10-CM

## 2020-08-18 PROBLEM — Z34.90 SUPERVISION OF NORMAL PREGNANCY: Status: ACTIVE | Noted: 2020-08-18

## 2020-08-18 PROCEDURE — 99213 OFFICE O/P EST LOW 20 MIN: CPT | Performed by: NURSE PRACTITIONER

## 2020-08-18 NOTE — PROGRESS NOTES
CC: Prenatal visit    Swapna Vidales is a 24 y.o.  at 11w4d.  Doing well.  Denies contractions, LOF, or VB. Nause and vomiting is improved, not taking Vitamnin B6 or Unisom. Wants to follow-up with her Dr. Francisco, her PCP regarding her neck pain. She is noticing that her right eye is twitching that is how it started. She is hoping to get a scan. Is declining physical therapy at this time.     /68   Wt 61.3 kg (135 lb 3.2 oz)   Breastfeeding Unknown   BMI 24.73 kg/m²   SVE: Deferred     Fetal Heart Rate: 140s     Problems (from 20 to 20)     Problem Noted Resolved    Supervision of normal pregnancy 2020 by Lawanda Steve APRN No    Overview Addendum 2020 10:09 AM by Lawanda Steve APRN     A pos / Rubella Imm / GBS at 36 wks  Dating: by CRL NAKIA 3/5/2021  Genetics: Declined  Anatomy: 20 wks  Glucola: 24-28 wks  Tdap: 28 wks  Lab Results   Component Value Date    WBC 8.68 2020    HGB 13.7 2020    HCT 40.6 2020    MCV 92.9 2020     (L) 2020   Plans to breastfeed           Thrombocytopenia affecting pregnancy (CMS/[facility]) 2020 by Lawanda Steve APRN No    Overview Signed 2020 10:07 AM by Lawanda Steve APRN     PLT at ,000 on 2020  Repeat CBC at next appt.          Genital herpes affecting pregnancy in first trimester 2020 by Lawanda Steve APRN No    Overview Signed 2020 11:52 AM by Lawanda Steve APRN     Supression at 35 weeks.          Neck pain 2020 by Lawanda Steve APRN No    Overview Addendum 2020 10:00 AM by Lawanda Steve APRN     Previously on Kenalog injection. Declines flexeril. Discussed physical therapy; pt declines.     Pt desires to f/u with Dr. Francisco, PCP- 20         Marijuana abuse in remission 2020 by Lawanda Steve APRN No    Overview Addendum 2020 10:09 AM by Lawanda Steve APRN     Counseling provided; has  stopped.          Nausea and vomiting during pregnancy prior to 22 weeks gestation 2020 by Lawanda Steve APRN 2020 by Lawanda Steve APRN    Overview Signed 2020 11:55 AM by Lawanda Steve APRN     Vitamin B6 and Unisom rx.               A/P: Swapna Vidales is a 24 y.o.  at 11w4d.  - Reviewed NIPS and carrier screening again; pt DECLINES.  - Discussed low platelet count; discuss to monitor any excessive bleeding or bruising. Will repeat CBC at next appt.   - RTC in 5 weeks  - Reviewed COVID-19 visitation policy  - Reviewed COVID-19 precautions     Diagnosis Plan   1. 11 weeks gestation of pregnancy     2. Genital herpes affecting pregnancy in first trimester     3. Neck pain  Pt to f/u with PCP   4. Nausea and vomiting during pregnancy prior to 22 weeks gestation  Resolved   5. Marijuana abuse in remission  Pt has stopped   6. Encounter for supervision of other normal pregnancy in first trimester     7. Thrombocytopenia affecting pregnancy (CMS/HCC)  CBC Auto Differential     DANIEL Hawthorne  2020  10:14

## 2020-08-26 ENCOUNTER — TELEMEDICINE (OUTPATIENT)
Dept: FAMILY MEDICINE CLINIC | Facility: CLINIC | Age: 25
End: 2020-08-26

## 2020-08-26 DIAGNOSIS — M54.2 NECK PAIN: Primary | ICD-10-CM

## 2020-08-26 DIAGNOSIS — D69.6 THROMBOCYTOPENIA (HCC): ICD-10-CM

## 2020-08-26 PROCEDURE — 99442 PR PHYS/QHP TELEPHONE EVALUATION 11-20 MIN: CPT | Performed by: FAMILY MEDICINE

## 2020-08-26 NOTE — PROGRESS NOTES
Subjective   Swapna Vidales is a 24 y.o. female.     Pain   Associated symptoms include neck pain. Pertinent negatives include no fever.   Neck Pain    This is a recurrent problem. The problem occurs intermittently. The problem has been waxing and waning. The pain is associated with nothing. The pain is present in the right side. The quality of the pain is described as aching. The pain is at a severity of 8/10. The pain is mild. The symptoms are aggravated by position. Pertinent negatives include no fever or weight loss. She has tried nothing for the symptoms.        The following portions of the patient's history were reviewed and updated as appropriate: allergies, current medications, past family history, past medical history, past social history, past surgical history and problem list.    Review of Systems   Constitutional: Negative for fever and weight loss.   Musculoskeletal: Positive for neck pain.       Objective   Physical Exam   Constitutional: She is oriented to person, place, and time. She appears well-developed and well-nourished. No distress.   HENT:   Head: Normocephalic and atraumatic.   Neurological: She is alert and oriented to person, place, and time.   Skin: She is not diaphoretic.   Psychiatric: She has a normal mood and affect. Her behavior is normal. Judgment and thought content normal.   Nursing note and vitals reviewed.      Assessment/Plan   Problems Addressed this Visit        Nervous and Auditory    Neck pain - Primary      Other Visit Diagnoses     Thrombocytopenia (CMS/HCC)              Patient was seen by me on 7/9/2020 with pain in her neck at that point my exam so some pain and muscle spasm but no masses or lymphadenopathy.  She is now pregnant and her OB work-up was positive for thrombocytopenia with a platelet count of 116.  This is new for her.  Will recheck CBC today proceed as needed  You have chosen to receive care through a telephone visit. Do you consent to use a  telephone visit for your medical care today? Yes  This visit has been rescheduled as a phone visit to comply with patient safety concerns in accordance with CDC recommendations. Total time of discussion was 14 minutes.

## 2021-03-15 ENCOUNTER — ROUTINE PRENATAL (OUTPATIENT)
Dept: OBSTETRICS AND GYNECOLOGY | Facility: CLINIC | Age: 26
End: 2021-03-15

## 2021-03-15 VITALS — BODY MASS INDEX: 24.22 KG/M2 | DIASTOLIC BLOOD PRESSURE: 86 MMHG | SYSTOLIC BLOOD PRESSURE: 124 MMHG | WEIGHT: 132.4 LBS

## 2021-03-15 DIAGNOSIS — Z36.85 ANTENATAL SCREENING FOR STREPTOCOCCUS B: ICD-10-CM

## 2021-03-15 DIAGNOSIS — Z36.3 SCREENING, ANTENATAL, FOR MALFORMATION BY ULTRASOUND: Primary | ICD-10-CM

## 2021-03-15 DIAGNOSIS — F12.11 MARIJUANA ABUSE IN REMISSION: ICD-10-CM

## 2021-03-15 DIAGNOSIS — R89.9 ABNORMAL LABORATORY TEST: ICD-10-CM

## 2021-03-15 DIAGNOSIS — M54.2 NECK PAIN: ICD-10-CM

## 2021-03-15 DIAGNOSIS — Z34.83 ENCOUNTER FOR SUPERVISION OF OTHER NORMAL PREGNANCY IN THIRD TRIMESTER: ICD-10-CM

## 2021-03-15 DIAGNOSIS — O48.0 POST TERM PREGNANCY AT 41 WEEKS GESTATION: Primary | ICD-10-CM

## 2021-03-15 DIAGNOSIS — O09.33 INSUFFICIENT PRENATAL CARE IN THIRD TRIMESTER: ICD-10-CM

## 2021-03-15 DIAGNOSIS — O99.119 THROMBOCYTOPENIA AFFECTING PREGNANCY (HCC): ICD-10-CM

## 2021-03-15 DIAGNOSIS — Z3A.41 POST TERM PREGNANCY AT 41 WEEKS GESTATION: Primary | ICD-10-CM

## 2021-03-15 DIAGNOSIS — A60.09 GENITAL HERPES AFFECTING PREGNANCY IN FIRST TRIMESTER: ICD-10-CM

## 2021-03-15 DIAGNOSIS — O98.311 GENITAL HERPES AFFECTING PREGNANCY IN FIRST TRIMESTER: ICD-10-CM

## 2021-03-15 DIAGNOSIS — D69.6 THROMBOCYTOPENIA AFFECTING PREGNANCY (HCC): ICD-10-CM

## 2021-03-15 LAB
AMPHET+METHAMPHET UR QL: NEGATIVE
AMPHETAMINES UR QL: NEGATIVE
BARBITURATES UR QL SCN: NEGATIVE
BENZODIAZ UR QL SCN: NEGATIVE
BUPRENORPHINE SERPL-MCNC: NEGATIVE NG/ML
CANNABINOIDS SERPL QL: POSITIVE
COCAINE UR QL: NEGATIVE
METHADONE UR QL SCN: NEGATIVE
OPIATES UR QL: NEGATIVE
OXYCODONE UR QL SCN: NEGATIVE
PCP UR QL SCN: NEGATIVE
PROPOXYPH UR QL: NEGATIVE
TRICYCLICS UR QL SCN: NEGATIVE

## 2021-03-15 PROCEDURE — 87653 STREP B DNA AMP PROBE: CPT | Performed by: FAMILY MEDICINE

## 2021-03-15 PROCEDURE — 80306 DRUG TEST PRSMV INSTRMNT: CPT | Performed by: FAMILY MEDICINE

## 2021-03-15 PROCEDURE — 99214 OFFICE O/P EST MOD 30 MIN: CPT | Performed by: FAMILY MEDICINE

## 2021-03-15 NOTE — PROGRESS NOTES
CC: Prenatal visit    Swapna Vidales is a 25 y.o.  at 41w3d.  Denies LOF, or VB.  Reports good FM. She reports some irregular contractions.  Pt has been lost to follow up since ~11wk GA. States she went on a trip out of state with a dying family member & then never re-established obstetric care when she returned to [place].  She is concerned today that there may be something wrong with her baby since she has not already gone into labor.    US: cephalic, posterior placenta, FHR 133bpm, EFW 3771g (68%tile), BPP     /86   Wt 60.1 kg (132 lb 6.4 oz)   BMI 24.22 kg/m²   SVE: 3-4/50/-3  Fundal Height (cm): 40 cm  Fetal Heart Rate: 133     Problems (from 20 to present)     Problem Noted Resolved    Supervision of normal pregnancy 2020 by Lawanda Steve APRN No    Overview Addendum 2020 10:09 AM by Lawanda Steve APRN     A pos / Rubella Imm / GBS at 36 wks  Dating: by CRL NAKIA 3/5/2021  Genetics: Declined  Anatomy: 20 wks  Glucola: 24-28 wks  Tdap: 28 wks  Lab Results   Component Value Date    WBC 8.68 2020    HGB 13.7 2020    HCT 40.6 2020    MCV 92.9 2020     (L) 2020   Plans to breastfeed           Previous Version    Thrombocytopenia affecting pregnancy (CMS/[facility]) 2020 by Lawanda Steve APRN No    Overview Signed 2020 10:07 AM by Lawanda Steve APRN     PLT at ,000 on 2020  Repeat CBC at next appt.          Genital herpes affecting pregnancy in first trimester 2020 by Lawanda Steve APRN No    Overview Signed 2020 11:52 AM by Lawanda Steve APRN     Supression at 35 weeks.          Neck pain 2020 by Lawanda Steve APRN No    Overview Addendum 2020 10:00 AM by Lawanda Steve APRN     Previously on Kenalog injection. Declines flexeril. Discussed physical therapy; pt declines.     Pt desires to f/u with Dr. Francisco, PCP- 20         Previous Version     Marijuana abuse in remission 2020 by Lawanda Steve APRN No    Overview Addendum 2020 10:09 AM by Lawanda Steve APRN     Counseling provided; has stopped.          Previous Version    Nausea and vomiting during pregnancy prior to 22 weeks gestation 2020 by Lawanda Steve APRN 2020 by Lawanda Steve APRN    Overview Signed 2020 11:55 AM by Lawanda Steve APRN     Vitamin B6 and Unisom rx.               A/P: Swapna Vidales is a 25 y.o.  at 41w3d.  - case d/w MFM (Dr. Orozco) who recommends delivery for postdates status    - Counseled patient on need for delivery due to postdates status. She is agreeable but reports she would have to go home & get her  & arrange .  Discussed IOL on 3/17/21 & pt is agreeable; she assures me that she will come in for IOL on that date.    - IOL scheduled 3/17/21; plan for pitocin. GBS collected today.  - [facility] reviewed, labor signs/symptoms discussed. Patient knows where to go if she thinks she is in labor.       Diagnosis Plan   1. Post term pregnancy at 41 weeks gestation     2. Thrombocytopenia affecting pregnancy (CMS/HCC)     3. Encounter for supervision of other normal pregnancy in third trimester     4. Genital herpes affecting pregnancy in first trimester     5. Neck pain     6. Marijuana abuse in remission     7.  screening for streptococcus B  Group B Strep (Molecular) - Swab, Vaginal/Rectum   8. Abnormal laboratory test  Urine Drug Screen - Urine, Clean Catch   9. Insufficient prenatal care in third trimester         Signature  Aliyah Felder MD  [facility]  [address] [place] [address]  Office: [phone]      This document has been electronically signed by Aliyah Felder MD on 2021 08:00 CDT

## 2021-03-16 DIAGNOSIS — Z3A.41 POST TERM PREGNANCY AT 41 WEEKS GESTATION: Primary | ICD-10-CM

## 2021-03-16 DIAGNOSIS — O48.0 POST TERM PREGNANCY AT 41 WEEKS GESTATION: Primary | ICD-10-CM

## 2021-03-16 PROBLEM — O09.30 INSUFFICIENT PRENATAL CARE: Status: ACTIVE | Noted: 2021-03-16

## 2021-03-16 LAB — GROUP B STREP, DNA: NEGATIVE

## 2021-03-16 RX ORDER — METHYLERGONOVINE MALEATE 0.2 MG/ML
200 INJECTION INTRAVENOUS ONCE AS NEEDED
Status: CANCELLED | OUTPATIENT
Start: 2021-03-16

## 2021-03-16 RX ORDER — SODIUM CHLORIDE 0.9 % (FLUSH) 0.9 %
3 SYRINGE (ML) INJECTION EVERY 12 HOURS SCHEDULED
Status: CANCELLED | OUTPATIENT
Start: 2021-03-16

## 2021-03-16 RX ORDER — SODIUM CHLORIDE, SODIUM LACTATE, POTASSIUM CHLORIDE, CALCIUM CHLORIDE 600; 310; 30; 20 MG/100ML; MG/100ML; MG/100ML; MG/100ML
125 INJECTION, SOLUTION INTRAVENOUS CONTINUOUS
Status: CANCELLED | OUTPATIENT
Start: 2021-03-16

## 2021-03-16 RX ORDER — OXYTOCIN 10 [USP'U]/ML
2-20 INJECTION, SOLUTION INTRAMUSCULAR; INTRAVENOUS
Status: CANCELLED | OUTPATIENT
Start: 2021-03-16

## 2021-03-16 RX ORDER — LIDOCAINE HYDROCHLORIDE 10 MG/ML
5 INJECTION, SOLUTION EPIDURAL; INFILTRATION; INTRACAUDAL; PERINEURAL AS NEEDED
Status: CANCELLED | OUTPATIENT
Start: 2021-03-16

## 2021-03-16 RX ORDER — MISOPROSTOL 100 UG/1
800 TABLET ORAL AS NEEDED
Status: CANCELLED | OUTPATIENT
Start: 2021-03-16

## 2021-03-16 RX ORDER — CARBOPROST TROMETHAMINE 250 UG/ML
250 INJECTION, SOLUTION INTRAMUSCULAR AS NEEDED
Status: CANCELLED | OUTPATIENT
Start: 2021-03-16

## 2021-03-16 RX ORDER — SODIUM CHLORIDE 0.9 % (FLUSH) 0.9 %
10 SYRINGE (ML) INJECTION AS NEEDED
Status: CANCELLED | OUTPATIENT
Start: 2021-03-16

## 2021-03-16 NOTE — H&P (VIEW-ONLY)
[facility] - OB History and Physical  ?  ?  Name: Swapna Vidales   Age: 25 y.o.       CC: IOL    HPI:   25 y.o.  here for IOL at 41w5d for postdates.  Her prenatal care was interrupted from 11wks to 41w3d.  She was seen in the office 3/15/21 to re-establish care; ultrasound at that time was overall wnl with BPP 8/8.  She reports good fetal movement, no LOF, no VB.  She has had irregular contractions.    ??  LMP: No LMP recorded. Patient is pregnant. EDC: Estimated Date of Delivery: 3/5/21 by 1 TUS (20 at 9w4d)     Placental Location: posterior  Other Findings: TAUS 3/15/21: cephalic,  FHR 133bpm, EFW 3771g (68%tile), BPP 8/8    Prenatal Course:    Problems (from 20 to present)     Problem Noted Resolved    Insufficient prenatal care 3/16/2021 by Aliyah Felder MD No    Supervision of normal pregnancy 2020 by Lawanda Steve APRN No    Overview Addendum 2020 10:09 AM by Lawanda Steve APRN     A pos / Rubella Imm / GBS at 36 wks  Dating: by CRL NAKIA 3/5/2021  Genetics: Declined  Anatomy: 20 wks  Glucola: not done  Tdap: not done  Lab Results   Component Value Date    WBC 8.68 2020    HGB 13.7 2020    HCT 40.6 2020    MCV 92.9 2020     (L) 2020   Plans to breastfeed           Previous Version    Thrombocytopenia affecting pregnancy (CMS/HCC) 2020 by Lawanda Steve APRN No    Overview Signed 2020 10:07 AM by Lawanda Steve APRN     PLT at ,000 on 2020  Repeat CBC at next appt.          Genital herpes affecting pregnancy in first trimester 2020 by Lawanda Steve APRN No    Overview Signed 2020 11:52 AM by Lawanda Steve APRN     Supression at 35 weeks.          Neck pain 2020 by Lawanda Steve APRN No    Overview Addendum 2020 10:00 AM by Lawanda Steve APRN     Previously on Kenalog injection. Declines flexeril. Discussed physical  therapy; pt declines.     Pt desires to f/u with Dr. Francisco, PCP- 20         Previous Version    Marijuana abuse in remission 2020 by Lawanda Steve APRN No    Overview Addendum 2020 10:09 AM by Lawanda Steve APRN     Counseling provided; has stopped.          Previous Version    Nausea and vomiting during pregnancy prior to 22 weeks gestation 2020 by Lawanda Steve APRN 2020 by Lawanda Steve APRN    Overview Signed 2020 11:55 AM by Lawanda Steve APRN     Vitamin B6 and Unisom rx.               Prior OB Hx:   OB History    Para Term  AB Living   4 2 2 0 1 2   SAB TAB Ectopic Molar Multiple Live Births   1 0 0   0 2      # Outcome Date GA Lbr [place]/2nd Weight Sex Delivery Anes PTL Lv   4 Current            3 Term 17 40w4d 05:59 / 01:11 3374 g (7 lb 7 oz) M Vag-Spont None N VINCENT   2 SAB 2016           1 Term 13 39w3d  2580 g (5 lb 11 oz) F Vag-Spont OTHER N VINCENT      Birth Comments: IV pain medicine      ?  Prenatal Labs:   External Prenatal Results     Pregnancy Outside Results - Transcribed From Office Records - See Scanned Records For Details     Test Value Date Time    Hgb  13.7 g/dL 20 1055    Hct  40.6 % 20 1055    ABO  A  20 1055    Rh  Positive  20 1055    Antibody Screen  Negative  20 1055    Glucose Fasting GTT       Glucose Tolerance Test 1 hour       Glucose Tolerance Test 3 hour       Gonorrhea (discrete)  Negative  20 1055    Chlamydia (discrete)  Negative  20 1055    RPR  Non-Reactive  20 1055    VDRL       Syphilis Antibody       Rubella  Positive  20 1055    HBsAg  Non-Reactive  20 1055    Herpes Simplex Virus PCR       Herpes Simplex VIrus Culture       HIV  Non-Reactive  20 1055    Hep C RNA Quant PCR       Hep C Antibody  Non-Reactive  20 1055    AFP       Group B Strep  Negative  17 1200    GBS Susceptibility to Clindamycin       GBS  Susceptibility to Erythromycin       Fetal Fibronectin       Genetic Testing, Maternal Blood             Drug Screening     Test Value Date Time    Urine Drug Screen       Amphetamine Screen  Negative  03/15/21 1430       Negative  20 1055    Barbiturate Screen  Negative  03/15/21 1430       Negative  20 1055    Benzodiazepine Screen  Negative  03/15/21 1430       Negative  20 1055    Methadone Screen  Negative  03/15/21 1430       Negative  20 1055    Phencyclidine Screen  Negative  03/15/21 1430       Negative  20 1055    Opiates Screen  Negative  03/15/21 1430       Negative  20 1055    THC Screen  Positive  03/15/21 1430       Positive  20 1055    Cocaine Screen       Propoxyphene Screen  Negative  03/15/21 1430       Negative  20 1055    Buprenorphine Screen  Negative  03/15/21 1430       Negative  20 1055    Methamphetamine Screen       Oxycodone Screen  Negative  03/15/21 1430       Negative  20 1055    Tricyclic Antidepressants Screen  Negative  03/15/21 1430       Negative  20 1055          Legend    ^: Historical                        Past Medical History:   Diagnosis Date   • Abdominal pain    • Acute maxillary sinusitis    • Acute pharyngitis    • Acute sinusitis    • Acute tonsillitis    • Acute upper respiratory infection    • Allergic rhinitis    • Anxiety    • Bacterial vaginosis    • Bronchitis    • Conjunctivitis    • Cystitis    • Diarrhea    • Encounter for general counseling and advice on contraceptive management    • Encounter for  visit      visit status     • Encounters for administrative purposes     Exam, medical, administrative    • Gastroenteritis    • Genital herpes simplex    • Headache    • History of postpartum depression    • Increased frequency of urination    • Influenza    • Irregular menses    • Irregular menstruation     unspecified     • Nausea and vomiting    • Otitis media    • Ovarian cyst      • Patient currently pregnant    • Pelvic pain     C/O pelvic pain    • Respiratory syncytial virus infection     three months of age     • Routine postpartum follow-up    • Streptococcal sore throat    • Substance abuse (CMS/HCC)     MARIJUANA   • Upper respiratory infection    • Varicella      Past Surgical History:   Procedure Laterality Date   • COLONOSCOPY N/A 8/24/2018    Procedure: COLONOSCOPY;  Surgeon: Jimmie Reilly MD;  Location: [facility] ENDOSCOPY;  Service: Gastroenterology   • ENDOSCOPY N/A 8/24/2018    Procedure: ESOPHAGOGASTRODUODENOSCOPY;  Surgeon: Jimmie Reilly MD;  Location: [facility] ENDOSCOPY;  Service: Gastroenterology   • VAGINAL DELIVERY  09/04/2013    Spontaneous vaginal delivery at term.   • WISDOM TOOTH EXTRACTION       Family History   Problem Relation Age of Onset   • Hyperlipidemia Father         High Cholesterol   • Heart block Father    • Diabetes Father    • Hypertension Father    • Ovarian cysts Sister    • Thyroid disease Mother         hypothyroidism   • Hypertension Mother    • Hyperlipidemia Mother    • Heart disease Other    • Hypertension Other    • Diabetes Other    • No Known Problems Son    • No Known Problems Daughter    • No Known Problems Brother      Social History     Tobacco Use   • Smoking status: Never Smoker   • Smokeless tobacco: Never Used   Substance Use Topics   • Alcohol use: No   • Drug use: Not Currently     Types: Marijuana       No Known Allergies     Prior to Admission medications    Medication Sig Start Date End Date Taking? Authorizing Provider   cetirizine (zyrTEC) 10 MG tablet Take 10 mg by mouth Daily.   Yes Emergency, Nurse Epic, RN   prenatal vitamin (prenatal, CLASSIC, vitamin) tablet Take 1 tablet by mouth Daily.   Yes Provider, MD Moris   albuterol sulfate  (90 Base) MCG/ACT inhaler Inhale 2 puffs Every 4 (Four) Hours As Needed for Wheezing. 5/29/19   Noemí Reynolds, DANIEL       Review of Systems   Constitutional:  Negative for chills and fever.   Eyes: Negative for photophobia and visual disturbance.   Respiratory: Negative for cough and shortness of breath.    Cardiovascular: Negative for chest pain, palpitations and leg swelling.   Gastrointestinal: Negative for abdominal pain, diarrhea, nausea and vomiting.   Genitourinary: Negative for dysuria, vaginal bleeding and vaginal discharge.   Skin: Negative for color change and rash.   Neurological: Negative for dizziness, light-headedness and headache.   Hematological: Negative for adenopathy. Does not bruise/bleed easily.   Psychiatric/Behavioral: Negative for depressed mood. The patient is not nervous/anxious.    All other systems reviewed and are negative.      Physical Exam:  Vitals:/86   Wt 60.1 kg (132 lb 6.4 oz)   BMI 24.22 kg/m²     General:  Alert, cooperative, appears stated age, no distress    Skin:  Warm, well perfused no rashes   Lungs:  clear to auscultation bilaterally   Heart:  Regular rate and rhythm or S1S2 present   GI: Soft; positive bowel sounds; gravid uterus; FHT present   Extremities: no edema    Reflexes: 2+, symmetrical   Fundal Height: size equals dates   Presentation: Cephalic by ultrasound   FHT:  bpm   Cervix: ?3-4/50/-3 soft/mid/vtx    ?  Current Labs:  Lab Results   Component Value Date    WBC 8.68 2020    HGB 13.7 2020    HCT 40.6 2020    MCV 92.9 2020     (L) 2020       A/P: 25 y.o.  admit at 41w5d for Postdates IOL  Moore Score: 6  GBS: pending  CLD  Start Pitocin 2 milliunits/min and increase by 2 milliunits/min q.30min until adequate contraction pattern  cEFM/TOCO    ?  Swapna and I have discussed pain goals for this hospitalization after reviewing her current clinical condition, medical history and prior pain experiences.  The goal is to keep her pain controlled.  She may have an epidural for labor analgesia; postpartum pain control with scheduled  Tylenol/Motrin  ?  Signature  Aliyah Felder MD  [address], [place] [address]  PH: [phone]      This document has been electronically signed by Aliyah Felder MD on March 16, 2021 08:01 CDT

## 2021-03-16 NOTE — H&P
[facility] - OB History and Physical  ?  ?  Name: Swapna Vidales   Age: 25 y.o.       CC: IOL    HPI:   25 y.o.  here for IOL at 41w5d for postdates.  Her prenatal care was interrupted from 11wks to 41w3d.  She was seen in the office 3/15/21 to re-establish care; ultrasound at that time was overall wnl with BPP 8/8.  She reports good fetal movement, no LOF, no VB.  She has had irregular contractions.    ??  LMP: No LMP recorded. Patient is pregnant. EDC: Estimated Date of Delivery: 3/5/21 by 1 TUS (20 at 9w4d)     Placental Location: posterior  Other Findings: TAUS 3/15/21: cephalic,  FHR 133bpm, EFW 3771g (68%tile), BPP 8/8    Prenatal Course:    Problems (from 20 to present)     Problem Noted Resolved    Insufficient prenatal care 3/16/2021 by Aliyah Felder MD No    Supervision of normal pregnancy 2020 by Lawanda Steve APRN No    Overview Addendum 2020 10:09 AM by Lawanda Steve APRN     A pos / Rubella Imm / GBS at 36 wks  Dating: by CRL NAKIA 3/5/2021  Genetics: Declined  Anatomy: 20 wks  Glucola: not done  Tdap: not done  Lab Results   Component Value Date    WBC 8.68 2020    HGB 13.7 2020    HCT 40.6 2020    MCV 92.9 2020     (L) 2020   Plans to breastfeed           Previous Version    Thrombocytopenia affecting pregnancy (CMS/HCC) 2020 by Lawanda Steve APRN No    Overview Signed 2020 10:07 AM by Lawanda Steve APRN     PLT at ,000 on 2020  Repeat CBC at next appt.          Genital herpes affecting pregnancy in first trimester 2020 by Lawanda Steve APRN No    Overview Signed 2020 11:52 AM by Lawanda Steve APRN     Supression at 35 weeks.          Neck pain 2020 by Lawanda Steve APRN No    Overview Addendum 2020 10:00 AM by Lawanda Steve APRN     Previously on Kenalog injection. Declines flexeril. Discussed physical  therapy; pt declines.     Pt desires to f/u with Dr. Francisco, PCP- 20         Previous Version    Marijuana abuse in remission 2020 by Lawanda Steve APRN No    Overview Addendum 2020 10:09 AM by Lawanda Steve APRN     Counseling provided; has stopped.          Previous Version    Nausea and vomiting during pregnancy prior to 22 weeks gestation 2020 by Lawanda Steve APRN 2020 by Lawanda Steve APRN    Overview Signed 2020 11:55 AM by Lawanda Steve APRN     Vitamin B6 and Unisom rx.               Prior OB Hx:   OB History    Para Term  AB Living   4 2 2 0 1 2   SAB TAB Ectopic Molar Multiple Live Births   1 0 0   0 2      # Outcome Date GA Lbr [place]/2nd Weight Sex Delivery Anes PTL Lv   4 Current            3 Term 17 40w4d 05:59 / 01:11 3374 g (7 lb 7 oz) M Vag-Spont None N VINCENT   2 SAB 2016           1 Term 13 39w3d  2580 g (5 lb 11 oz) F Vag-Spont OTHER N VINCENT      Birth Comments: IV pain medicine      ?  Prenatal Labs:   External Prenatal Results     Pregnancy Outside Results - Transcribed From Office Records - See Scanned Records For Details     Test Value Date Time    Hgb  13.7 g/dL 20 1055    Hct  40.6 % 20 1055    ABO  A  20 1055    Rh  Positive  20 1055    Antibody Screen  Negative  20 1055    Glucose Fasting GTT       Glucose Tolerance Test 1 hour       Glucose Tolerance Test 3 hour       Gonorrhea (discrete)  Negative  20 1055    Chlamydia (discrete)  Negative  20 1055    RPR  Non-Reactive  20 1055    VDRL       Syphilis Antibody       Rubella  Positive  20 1055    HBsAg  Non-Reactive  20 1055    Herpes Simplex Virus PCR       Herpes Simplex VIrus Culture       HIV  Non-Reactive  20 1055    Hep C RNA Quant PCR       Hep C Antibody  Non-Reactive  20 1055    AFP       Group B Strep  Negative  17 1200    GBS Susceptibility to Clindamycin       GBS  Susceptibility to Erythromycin       Fetal Fibronectin       Genetic Testing, Maternal Blood             Drug Screening     Test Value Date Time    Urine Drug Screen       Amphetamine Screen  Negative  03/15/21 1430       Negative  20 1055    Barbiturate Screen  Negative  03/15/21 1430       Negative  20 1055    Benzodiazepine Screen  Negative  03/15/21 1430       Negative  20 1055    Methadone Screen  Negative  03/15/21 1430       Negative  20 1055    Phencyclidine Screen  Negative  03/15/21 1430       Negative  20 1055    Opiates Screen  Negative  03/15/21 1430       Negative  20 1055    THC Screen  Positive  03/15/21 1430       Positive  20 1055    Cocaine Screen       Propoxyphene Screen  Negative  03/15/21 1430       Negative  20 1055    Buprenorphine Screen  Negative  03/15/21 1430       Negative  20 1055    Methamphetamine Screen       Oxycodone Screen  Negative  03/15/21 1430       Negative  20 1055    Tricyclic Antidepressants Screen  Negative  03/15/21 1430       Negative  20 1055          Legend    ^: Historical                        Past Medical History:   Diagnosis Date   • Abdominal pain    • Acute maxillary sinusitis    • Acute pharyngitis    • Acute sinusitis    • Acute tonsillitis    • Acute upper respiratory infection    • Allergic rhinitis    • Anxiety    • Bacterial vaginosis    • Bronchitis    • Conjunctivitis    • Cystitis    • Diarrhea    • Encounter for general counseling and advice on contraceptive management    • Encounter for  visit      visit status     • Encounters for administrative purposes     Exam, medical, administrative    • Gastroenteritis    • Genital herpes simplex    • Headache    • History of postpartum depression    • Increased frequency of urination    • Influenza    • Irregular menses    • Irregular menstruation     unspecified     • Nausea and vomiting    • Otitis media    • Ovarian cyst     • Patient currently pregnant    • Pelvic pain     C/O pelvic pain    • Respiratory syncytial virus infection     three months of age     • Routine postpartum follow-up    • Streptococcal sore throat    • Substance abuse (CMS/HCC)     MARIJUANA   • Upper respiratory infection    • Varicella      Past Surgical History:   Procedure Laterality Date   • COLONOSCOPY N/A 8/24/2018    Procedure: COLONOSCOPY;  Surgeon: Jimmie Reilly MD;  Location: [facility] ENDOSCOPY;  Service: Gastroenterology   • ENDOSCOPY N/A 8/24/2018    Procedure: ESOPHAGOGASTRODUODENOSCOPY;  Surgeon: Jimmie Reilly MD;  Location: [facility] ENDOSCOPY;  Service: Gastroenterology   • VAGINAL DELIVERY  09/04/2013    Spontaneous vaginal delivery at term.   • WISDOM TOOTH EXTRACTION       Family History   Problem Relation Age of Onset   • Hyperlipidemia Father         High Cholesterol   • Heart block Father    • Diabetes Father    • Hypertension Father    • Ovarian cysts Sister    • Thyroid disease Mother         hypothyroidism   • Hypertension Mother    • Hyperlipidemia Mother    • Heart disease Other    • Hypertension Other    • Diabetes Other    • No Known Problems Son    • No Known Problems Daughter    • No Known Problems Brother      Social History     Tobacco Use   • Smoking status: Never Smoker   • Smokeless tobacco: Never Used   Substance Use Topics   • Alcohol use: No   • Drug use: Not Currently     Types: Marijuana       No Known Allergies     Prior to Admission medications    Medication Sig Start Date End Date Taking? Authorizing Provider   cetirizine (zyrTEC) 10 MG tablet Take 10 mg by mouth Daily.   Yes Emergency, Nurse Epic, RN   prenatal vitamin (prenatal, CLASSIC, vitamin) tablet Take 1 tablet by mouth Daily.   Yes Provider, MD Moris   albuterol sulfate  (90 Base) MCG/ACT inhaler Inhale 2 puffs Every 4 (Four) Hours As Needed for Wheezing. 5/29/19   Noemí Reynolds, DANIEL       Review of Systems   Constitutional:  Negative for chills and fever.   Eyes: Negative for photophobia and visual disturbance.   Respiratory: Negative for cough and shortness of breath.    Cardiovascular: Negative for chest pain, palpitations and leg swelling.   Gastrointestinal: Negative for abdominal pain, diarrhea, nausea and vomiting.   Genitourinary: Negative for dysuria, vaginal bleeding and vaginal discharge.   Skin: Negative for color change and rash.   Neurological: Negative for dizziness, light-headedness and headache.   Hematological: Negative for adenopathy. Does not bruise/bleed easily.   Psychiatric/Behavioral: Negative for depressed mood. The patient is not nervous/anxious.    All other systems reviewed and are negative.      Physical Exam:  Vitals:/86   Wt 60.1 kg (132 lb 6.4 oz)   BMI 24.22 kg/m²     General:  Alert, cooperative, appears stated age, no distress    Skin:  Warm, well perfused no rashes   Lungs:  clear to auscultation bilaterally   Heart:  Regular rate and rhythm or S1S2 present   GI: Soft; positive bowel sounds; gravid uterus; FHT present   Extremities: no edema    Reflexes: 2+, symmetrical   Fundal Height: size equals dates   Presentation: Cephalic by ultrasound   FHT:  bpm   Cervix: ?3-4/50/-3 soft/mid/vtx    ?  Current Labs:  Lab Results   Component Value Date    WBC 8.68 2020    HGB 13.7 2020    HCT 40.6 2020    MCV 92.9 2020     (L) 2020       A/P: 25 y.o.  admit at 41w5d for Postdates IOL  Moore Score: 6  GBS: pending  CLD  Start Pitocin 2 milliunits/min and increase by 2 milliunits/min q.30min until adequate contraction pattern  cEFM/TOCO    ?  Swapna and I have discussed pain goals for this hospitalization after reviewing her current clinical condition, medical history and prior pain experiences.  The goal is to keep her pain controlled.  She may have an epidural for labor analgesia; postpartum pain control with scheduled  Tylenol/Motrin  ?  Signature  Aliyah Felder MD  [address], [place] [address]  PH: [phone]      This document has been electronically signed by Aliyah Felder MD on March 16, 2021 08:01 CDT

## 2021-03-17 ENCOUNTER — HOSPITAL ENCOUNTER (INPATIENT)
Facility: HOSPITAL | Age: 26
LOS: 1 days | Discharge: HOME | End: 2021-03-18
Attending: FAMILY MEDICINE | Admitting: FAMILY MEDICINE

## 2021-03-17 ENCOUNTER — HOSPITAL ENCOUNTER (INPATIENT)
Dept: LABOR AND DELIVERY | Facility: HOSPITAL | Age: 26
Discharge: HOME | End: 2021-03-17

## 2021-03-17 DIAGNOSIS — Z3A.41 POST TERM PREGNANCY AT 41 WEEKS GESTATION: ICD-10-CM

## 2021-03-17 DIAGNOSIS — O48.0 POST TERM PREGNANCY AT 41 WEEKS GESTATION: ICD-10-CM

## 2021-03-17 PROBLEM — O09.30 INSUFFICIENT PRENATAL CARE: Status: RESOLVED | Noted: 2021-03-16 | Resolved: 2021-03-17

## 2021-03-17 PROBLEM — Z34.90 SUPERVISION OF NORMAL PREGNANCY: Status: RESOLVED | Noted: 2020-08-18 | Resolved: 2021-03-17

## 2021-03-17 LAB
ABO GROUP BLD: NORMAL
AMPHET+METHAMPHET UR QL: NEGATIVE
AMPHETAMINES UR QL: NEGATIVE
BARBITURATES UR QL SCN: NEGATIVE
BENZODIAZ UR QL SCN: NEGATIVE
BLD GP AB SCN SERPL QL: NEGATIVE
BUPRENORPHINE SERPL-MCNC: NEGATIVE NG/ML
CANNABINOIDS SERPL QL: POSITIVE
COCAINE UR QL: NEGATIVE
DEPRECATED RDW RBC AUTO: 42.2 FL (ref 37–54)
ERYTHROCYTE [DISTWIDTH] IN BLOOD BY AUTOMATED COUNT: 14.5 % (ref 12.3–15.4)
HCT VFR BLD AUTO: 34.8 % (ref 34–46.6)
HGB BLD-MCNC: 11.7 G/DL (ref 12–15.9)
HOLD SPECIMEN: NORMAL
Lab: NORMAL
MCH RBC QN AUTO: 27.4 PG (ref 26.6–33)
MCHC RBC AUTO-ENTMCNC: 33.6 G/DL (ref 31.5–35.7)
MCV RBC AUTO: 81.5 FL (ref 79–97)
METHADONE UR QL SCN: NEGATIVE
OPIATES UR QL: NEGATIVE
OXYCODONE UR QL SCN: NEGATIVE
PCP UR QL SCN: NEGATIVE
PLATELET # BLD AUTO: 194 10*3/MM3 (ref 140–450)
PMV BLD AUTO: 11.1 FL (ref 6–12)
PROPOXYPH UR QL: NEGATIVE
RBC # BLD AUTO: 4.27 10*6/MM3 (ref 3.77–5.28)
RH BLD: POSITIVE
T&S EXPIRATION DATE: NORMAL
TRICYCLICS UR QL SCN: NEGATIVE
WBC # BLD AUTO: 14.63 10*3/MM3 (ref 3.4–10.8)

## 2021-03-17 PROCEDURE — 86901 BLOOD TYPING SEROLOGIC RH(D): CPT | Performed by: FAMILY MEDICINE

## 2021-03-17 PROCEDURE — 59410 OBSTETRICAL CARE: CPT | Performed by: FAMILY MEDICINE

## 2021-03-17 PROCEDURE — 86900 BLOOD TYPING SEROLOGIC ABO: CPT | Performed by: FAMILY MEDICINE

## 2021-03-17 PROCEDURE — 3E033VJ INTRODUCTION OF OTHER HORMONE INTO PERIPHERAL VEIN, PERCUTANEOUS APPROACH: ICD-10-PCS | Performed by: FAMILY MEDICINE

## 2021-03-17 PROCEDURE — G0480 DRUG TEST DEF 1-7 CLASSES: HCPCS | Performed by: FAMILY MEDICINE

## 2021-03-17 PROCEDURE — 85027 COMPLETE CBC AUTOMATED: CPT | Performed by: FAMILY MEDICINE

## 2021-03-17 PROCEDURE — 80306 DRUG TEST PRSMV INSTRMNT: CPT | Performed by: FAMILY MEDICINE

## 2021-03-17 PROCEDURE — 86850 RBC ANTIBODY SCREEN: CPT | Performed by: FAMILY MEDICINE

## 2021-03-17 RX ORDER — BISACODYL 10 MG
10 SUPPOSITORY, RECTAL RECTAL DAILY PRN
Status: DISCONTINUED | OUTPATIENT
Start: 2021-03-18 | End: 2021-03-18 | Stop reason: HOSPADM

## 2021-03-17 RX ORDER — PRENATAL VIT/IRON FUM/FOLIC AC 27MG-0.8MG
1 TABLET ORAL DAILY
Status: DISCONTINUED | OUTPATIENT
Start: 2021-03-17 | End: 2021-03-18 | Stop reason: HOSPADM

## 2021-03-17 RX ORDER — SODIUM CHLORIDE 0.9 % (FLUSH) 0.9 %
10 SYRINGE (ML) INJECTION AS NEEDED
Status: DISCONTINUED | OUTPATIENT
Start: 2021-03-17 | End: 2021-03-17

## 2021-03-17 RX ORDER — HYDROCORTISONE 25 MG/G
1 CREAM TOPICAL AS NEEDED
Status: DISCONTINUED | OUTPATIENT
Start: 2021-03-17 | End: 2021-03-18 | Stop reason: HOSPADM

## 2021-03-17 RX ORDER — CARBOPROST TROMETHAMINE 250 UG/ML
250 INJECTION, SOLUTION INTRAMUSCULAR AS NEEDED
Status: DISCONTINUED | OUTPATIENT
Start: 2021-03-17 | End: 2021-03-17 | Stop reason: HOSPADM

## 2021-03-17 RX ORDER — OXYTOCIN/0.9 % SODIUM CHLORIDE 30/500 ML
650 PLASTIC BAG, INJECTION (ML) INTRAVENOUS ONCE
Status: COMPLETED | OUTPATIENT
Start: 2021-03-17 | End: 2021-03-17

## 2021-03-17 RX ORDER — METHYLERGONOVINE MALEATE 0.2 MG/ML
200 INJECTION INTRAVENOUS ONCE AS NEEDED
Status: DISCONTINUED | OUTPATIENT
Start: 2021-03-17 | End: 2021-03-17 | Stop reason: HOSPADM

## 2021-03-17 RX ORDER — OXYTOCIN/0.9 % SODIUM CHLORIDE 30/500 ML
2-20 PLASTIC BAG, INJECTION (ML) INTRAVENOUS
Status: DISCONTINUED | OUTPATIENT
Start: 2021-03-17 | End: 2021-03-17

## 2021-03-17 RX ORDER — OXYTOCIN/0.9 % SODIUM CHLORIDE 30/500 ML
PLASTIC BAG, INJECTION (ML) INTRAVENOUS
Status: COMPLETED
Start: 2021-03-17 | End: 2021-03-17

## 2021-03-17 RX ORDER — LIDOCAINE HYDROCHLORIDE 10 MG/ML
5 INJECTION, SOLUTION EPIDURAL; INFILTRATION; INTRACAUDAL; PERINEURAL AS NEEDED
Status: DISCONTINUED | OUTPATIENT
Start: 2021-03-17 | End: 2021-03-17

## 2021-03-17 RX ORDER — LANOLIN 100 %
OINTMENT (GRAM) TOPICAL
Status: DISCONTINUED | OUTPATIENT
Start: 2021-03-17 | End: 2021-03-18 | Stop reason: HOSPADM

## 2021-03-17 RX ORDER — SODIUM CHLORIDE 0.9 % (FLUSH) 0.9 %
3 SYRINGE (ML) INJECTION EVERY 12 HOURS SCHEDULED
Status: DISCONTINUED | OUTPATIENT
Start: 2021-03-17 | End: 2021-03-17

## 2021-03-17 RX ORDER — DOCUSATE SODIUM 100 MG/1
100 CAPSULE, LIQUID FILLED ORAL 2 TIMES DAILY
Status: DISCONTINUED | OUTPATIENT
Start: 2021-03-17 | End: 2021-03-18 | Stop reason: HOSPADM

## 2021-03-17 RX ORDER — ACETAMINOPHEN 500 MG
1000 TABLET ORAL EVERY 6 HOURS
Status: DISCONTINUED | OUTPATIENT
Start: 2021-03-17 | End: 2021-03-18 | Stop reason: HOSPADM

## 2021-03-17 RX ORDER — IBUPROFEN 800 MG/1
800 TABLET ORAL EVERY 8 HOURS SCHEDULED
Status: DISCONTINUED | OUTPATIENT
Start: 2021-03-17 | End: 2021-03-18 | Stop reason: HOSPADM

## 2021-03-17 RX ORDER — ONDANSETRON 4 MG/1
4 TABLET, FILM COATED ORAL EVERY 8 HOURS PRN
Status: DISCONTINUED | OUTPATIENT
Start: 2021-03-17 | End: 2021-03-18 | Stop reason: HOSPADM

## 2021-03-17 RX ORDER — OXYTOCIN/0.9 % SODIUM CHLORIDE 30/500 ML
85 PLASTIC BAG, INJECTION (ML) INTRAVENOUS ONCE
Status: DISCONTINUED | OUTPATIENT
Start: 2021-03-17 | End: 2021-03-18 | Stop reason: HOSPADM

## 2021-03-17 RX ORDER — CETIRIZINE HYDROCHLORIDE 10 MG/1
10 TABLET ORAL DAILY
Status: DISCONTINUED | OUTPATIENT
Start: 2021-03-17 | End: 2021-03-18 | Stop reason: HOSPADM

## 2021-03-17 RX ORDER — MISOPROSTOL 200 UG/1
800 TABLET ORAL AS NEEDED
Status: DISCONTINUED | OUTPATIENT
Start: 2021-03-17 | End: 2021-03-17 | Stop reason: HOSPADM

## 2021-03-17 RX ORDER — SODIUM CHLORIDE 0.9 % (FLUSH) 0.9 %
1-10 SYRINGE (ML) INJECTION AS NEEDED
Status: DISCONTINUED | OUTPATIENT
Start: 2021-03-17 | End: 2021-03-18 | Stop reason: HOSPADM

## 2021-03-17 RX ORDER — SODIUM CHLORIDE, SODIUM LACTATE, POTASSIUM CHLORIDE, CALCIUM CHLORIDE 600; 310; 30; 20 MG/100ML; MG/100ML; MG/100ML; MG/100ML
125 INJECTION, SOLUTION INTRAVENOUS CONTINUOUS
Status: DISCONTINUED | OUTPATIENT
Start: 2021-03-17 | End: 2021-03-17

## 2021-03-17 RX ADMIN — PRENATAL VIT W/ FE FUMARATE-FA TAB 27-0.8 MG 1 TABLET: 27-0.8 TAB at 12:02

## 2021-03-17 RX ADMIN — DOCUSATE SODIUM 100 MG: 100 CAPSULE, LIQUID FILLED ORAL at 20:51

## 2021-03-17 RX ADMIN — ACETAMINOPHEN 1000 MG: 500 TABLET ORAL at 12:02

## 2021-03-17 RX ADMIN — IBUPROFEN 800 MG: 800 TABLET, FILM COATED ORAL at 16:33

## 2021-03-17 RX ADMIN — SODIUM CHLORIDE, POTASSIUM CHLORIDE, SODIUM LACTATE AND CALCIUM CHLORIDE 125 ML/HR: 600; 310; 30; 20 INJECTION, SOLUTION INTRAVENOUS at 05:35

## 2021-03-17 RX ADMIN — IBUPROFEN 800 MG: 800 TABLET, FILM COATED ORAL at 21:35

## 2021-03-17 RX ADMIN — ACETAMINOPHEN 1000 MG: 500 TABLET ORAL at 18:23

## 2021-03-17 RX ADMIN — CETIRIZINE HYDROCHLORIDE 10 MG: 10 TABLET, FILM COATED ORAL at 12:03

## 2021-03-17 RX ADMIN — Medication 650 ML/HR: at 08:54

## 2021-03-17 RX ADMIN — OXYTOCIN-SODIUM CHLORIDE 0.9% IV SOLN 30 UNIT/500ML 2 MILLI-UNITS/MIN: 30-0.9/5 SOLUTION at 06:53

## 2021-03-17 RX ADMIN — OXYTOCIN-SODIUM CHLORIDE 0.9% IV SOLN 30 UNIT/500ML 650 ML/HR: 30-0.9/5 SOLUTION at 08:54

## 2021-03-17 NOTE — INTERVAL H&P NOTE
H&P reviewed. The patient was examined and there are no changes to the H&P except as noted below:    Bedside scan performed to confirm cephalic position.  GBS neg  Start Pitocin 2 milliunits/min and increase by 2 milliunits/min q.30min until adequate contraction pattern    Signature  Aliyah Felder MD  [facility] [place] [address]  Office: [phone]      This document has been electronically signed by Aliyah Felder MD on March 17, 2021 06:51 CDT

## 2021-03-17 NOTE — L&D DELIVERY NOTE
[facility]  Vaginal Delivery Note    Delivery     Delivery: Vaginal, Spontaneous     YOB: 2023    Time of Birth:  Gestational Age 8:48 AM   41w5d     Anesthesia: None     Delivering clinician: Aliyah Felder    Forceps?   No   Vacuum? No    Shoulder dystocia present: No        Delivery narrative: Pt called out stating her water broke & she was feeling pelvic pressure; noted to be complete +1 on SVE.  On 3/17/2021 Swapna Vidales at , delivered a viable Female  infant to a sterile field with None  anesthesia from DERRICK position. Right anterior shoulder delivered without difficulty followed by left posterior shoulder.  Body was delivered with gentle traction, infant placed on mother's abdomen, WPDS (warmed, positioned, dried, stimulated), bulb suctioned. Cord clamped and cut after 1 minute of delayed clamping. Cord blood collected. Placenta with 3 vessel cord delivered spontaneous intact. 30 units of Pitocin given. Inspection revealed a None  tear.    mL. Infants weight 7 lb 8 oz (3402 g) . Apgars were 8  and 9  at one and five minutes, respectively. Infant and mother to recover in room.         Infant    Findings: female  infant     Infant observations: Weight: 3402 g (7 lb 8 oz)   Length: 19.5  in  Observations/Comments:        Apgars: 8  @ 1 minute /    9  @ 5 minutes     Placenta, Cord, and Fluid    Placenta delivered  Spontaneous  at   3/17/2021  8:54 AM     Cord: 3 vessels  present.   Nuchal Cord?  no   Cord blood obtained: Yes    Cord gases obtained:  No    Cord gas results: Venous:  No results found for: PHCVEN    Arterial:  No results found for: PHCART     Repair    Episiotomy: None     No    Lacerations: No   Estimated Blood Loss: Est. Blood Loss (mL): 100 mL (Filed from Delivery Summary) (21 0859)           Complications  none    Disposition  Mother to Mother Baby/Postpartum  in stable condition currently.  Baby to remains with mom  in stable condition  currently.      Aliyah Felder MD  03/17/21  11:53 CDT

## 2021-03-18 VITALS
OXYGEN SATURATION: 98 % | DIASTOLIC BLOOD PRESSURE: 58 MMHG | RESPIRATION RATE: 18 BRPM | BODY MASS INDEX: 29.81 KG/M2 | WEIGHT: 163 LBS | TEMPERATURE: 98.4 F | HEART RATE: 66 BPM | SYSTOLIC BLOOD PRESSURE: 107 MMHG

## 2021-03-18 LAB
BASOPHILS # BLD AUTO: 0.07 10*3/MM3 (ref 0–0.2)
BASOPHILS NFR BLD AUTO: 0.6 % (ref 0–1.5)
DEPRECATED RDW RBC AUTO: 43.4 FL (ref 37–54)
EOSINOPHIL # BLD AUTO: 0.2 10*3/MM3 (ref 0–0.4)
EOSINOPHIL NFR BLD AUTO: 1.7 % (ref 0.3–6.2)
ERYTHROCYTE [DISTWIDTH] IN BLOOD BY AUTOMATED COUNT: 14.7 % (ref 12.3–15.4)
HCT VFR BLD AUTO: 33.8 % (ref 34–46.6)
HGB BLD-MCNC: 11.1 G/DL (ref 12–15.9)
HOLD SPECIMEN: NORMAL
IMM GRANULOCYTES # BLD AUTO: 0.06 10*3/MM3 (ref 0–0.05)
IMM GRANULOCYTES NFR BLD AUTO: 0.5 % (ref 0–0.5)
LYMPHOCYTES # BLD AUTO: 3.16 10*3/MM3 (ref 0.7–3.1)
LYMPHOCYTES NFR BLD AUTO: 27 % (ref 19.6–45.3)
MCH RBC QN AUTO: 27.2 PG (ref 26.6–33)
MCHC RBC AUTO-ENTMCNC: 32.8 G/DL (ref 31.5–35.7)
MCV RBC AUTO: 82.8 FL (ref 79–97)
MONOCYTES # BLD AUTO: 0.54 10*3/MM3 (ref 0.1–0.9)
MONOCYTES NFR BLD AUTO: 4.6 % (ref 5–12)
NEUTROPHILS NFR BLD AUTO: 65.6 % (ref 42.7–76)
NEUTROPHILS NFR BLD AUTO: 7.67 10*3/MM3 (ref 1.7–7)
NRBC BLD AUTO-RTO: 0 /100 WBC (ref 0–0.2)
PLATELET # BLD AUTO: 172 10*3/MM3 (ref 140–450)
PMV BLD AUTO: 11.5 FL (ref 6–12)
RBC # BLD AUTO: 4.08 10*6/MM3 (ref 3.77–5.28)
WBC # BLD AUTO: 11.7 10*3/MM3 (ref 3.4–10.8)

## 2021-03-18 PROCEDURE — 85025 COMPLETE CBC W/AUTO DIFF WBC: CPT | Performed by: FAMILY MEDICINE

## 2021-03-18 PROCEDURE — 0503F POSTPARTUM CARE VISIT: CPT | Performed by: FAMILY MEDICINE

## 2021-03-18 RX ORDER — LANOLIN 100 %
OINTMENT (GRAM) TOPICAL
Qty: 40 G | Refills: 0 | Status: SHIPPED | OUTPATIENT
Start: 2021-03-18 | End: 2021-06-08

## 2021-03-18 RX ORDER — IBUPROFEN 800 MG/1
800 TABLET ORAL EVERY 8 HOURS SCHEDULED
Qty: 90 TABLET | Refills: 0 | Status: SHIPPED | OUTPATIENT
Start: 2021-03-18 | End: 2021-06-08

## 2021-03-18 RX ORDER — ACETAMINOPHEN 500 MG
1000 TABLET ORAL EVERY 6 HOURS
Qty: 240 TABLET | Refills: 0 | Status: SHIPPED | OUTPATIENT
Start: 2021-03-18 | End: 2021-06-08

## 2021-03-18 RX ADMIN — DOCUSATE SODIUM 100 MG: 100 CAPSULE, LIQUID FILLED ORAL at 08:04

## 2021-03-18 RX ADMIN — ACETAMINOPHEN 1000 MG: 500 TABLET ORAL at 05:43

## 2021-03-18 RX ADMIN — IBUPROFEN 800 MG: 800 TABLET, FILM COATED ORAL at 05:43

## 2021-03-18 RX ADMIN — PRENATAL VIT W/ FE FUMARATE-FA TAB 27-0.8 MG 1 TABLET: 27-0.8 TAB at 08:04

## 2021-03-18 RX ADMIN — BENZOCAINE AND LEVOMENTHOL: 200; 5 SPRAY TOPICAL at 08:04

## 2021-03-18 RX ADMIN — CETIRIZINE HYDROCHLORIDE 10 MG: 10 TABLET, FILM COATED ORAL at 08:04

## 2021-03-18 RX ADMIN — ACETAMINOPHEN 1000 MG: 500 TABLET ORAL at 01:12

## 2021-03-18 NOTE — PLAN OF CARE
Problem: Adult Inpatient Plan of Care  Goal: Plan of Care Review  Outcome: Ongoing, Progressing  Flowsheets (Taken 3/18/2021 5143)  Progress: improving  Plan of Care Reviewed With: patient  Outcome Summary: vss, ambulating and voiding well, pain well controlled, fundus firm bleeding light, passing flatus.   Goal Outcome Evaluation:  Plan of Care Reviewed With: patient  Progress: improving  Outcome Summary: vss, ambulating and voiding well, pain well controlled, fundus firm bleeding light, passing flatus.

## 2021-03-18 NOTE — PROGRESS NOTES
I have seen and evaluated the patient.  I have discussed the case with the student. I have reviewed the notes, assessment and plan, and/or procedures performed by the student. I concur with the student’s documentation.      Celsa see discharge summary for full attending note.          This document has been electronically signed by Aliyah Felder MD on 2021 08:26 T        [facility]  Progress Note - Obstetrics    Name: Swapna Vidales  MRN: [id number]  Location: 755/1  Date: 3/18/2021  CSN: [id number]       PPD #1 s/p  at 41 with 5 term labor     Patient seen and examined.  Pain controlled. Pt says she's only having some cramping and the minimal laceration is mildly painful. Denies headache, dizziness, chest pain, shortness of breath, nausea, vomiting.  Minimal/moderate lochia.  OOB and ambulating.  Tolerating regular diet.  Voiding without difficulty.  Breast-feeding well.    No birth control plan for pt-partner is getting a vasectomy    PHYSICAL EXAM  /71 (BP Location: Left arm, Patient Position: Sitting)   Pulse 68   Temp 97.9 °F (36.6 °C) (Oral)   Resp 16   Wt 73.9 kg (163 lb)   SpO2 99%   Breastfeeding Yes   BMI 29.81 kg/m²   General: AAOx3, no apparent distress.  Lungs: CTA B/L anteriorly, no wheezes, rales, rhonchi.  CV: Acceptable rate, regular rhythm, S1/S2 normal.  Abdomen: +BS, soft, nondistended, uterine fundus firm, nontender, and below umbilicus.  Lower extremities: No bilateral edema.  2+/4+ dorsalis pedis pulses B/L.    IMPRESSION  Swapna Vidales is a 25 y.o.  PPD #1 s/p .      PLAN  1.  Postpartum s/p   - Continue routine postpartum care.  - Diet: Pregnancy/breastfeeding  - Breast-feeding  - Birth control options were discussed and patient decided on none. Says partner is getting vasectomy  - Discharge to home later today or early tomorrow.  Follow-up in 2 weeks (telephone visit), 6 weeks with OB/GYN  provider.       This document has been electronically signed by Jie Yadav, Medical Student on March 18, 2021 06:00 CDT.

## 2021-03-18 NOTE — DISCHARGE SUMMARY
[facility] - Discharge Summary from Vaginal Delivery     Patient Name: Swapna Vidales  MRN: [id number]  : 1995  Admit Date: 3/17/2021  Discharge Date: 3/18/2021  Admitting Physician: Aliyah Felder MD  Discharge Physician: Aliyah Felder MD     Admit/Discharge Diagnoses:  Active Hospital Problems    Diagnosis  POA   • ** (normal spontaneous vaginal delivery) [O80]  Not Applicable      Resolved Hospital Problems    Diagnosis Date Resolved POA   • Post term pregnancy at 41 weeks gestation [O48.0, Z3A.41] 2021 Yes   • Insufficient prenatal care [O09.30] 2021 Not Applicable   • Supervision of normal pregnancy [Z34.90] 2021 Not Applicable        Hospital Course:  Swapna Vidales is a  who was admitted on 3/17/2021 for IOL at 41w5d GA, she was started on pitocin and progressed quickly in labor.  She had an uncomplicated vaginal delivery and postpartum course was unremarkable. She expressed desire for discharge on PPD# 1.       Procedures Performed:   Vaginal, Spontaneous      History:     The patient had a Vaginal, Spontaneous  on 3/17/2021 . She delivered a Female  infant that weighed 7 lb 8 oz (3402 g) . Apgars were 8  and 9  at one and five minutes, respectively. Delivery complications included None . Lacerations: None .        Subjective:  The patient feels well. Pain is well controlled with current medications. The baby is well. The patient is ambulating well. The patient is tolerating a normal diet. Lochia minimal.  Birth control plan: ?partner vasectomy vs POPs vs Nexplanon.  Feeding method: Breastfeed.     Objective:  Vitals:    21 0510   BP: 124/71   Pulse: 68   Resp:    Temp: 97.9 °F (36.6 °C)   SpO2: 99%      General: alert, well appearing, in no apparent distress  CVS: RRR  RESP: CTAB, normal effort  Uterine Fundus: firm  Ext: good ROM and strength     Labs at Discharge:  Lab Results   Component Value Date    WBC  11.70 (H) 03/18/2021    HGB 11.1 (L) 03/18/2021    HCT 33.8 (L) 03/18/2021    MCV 82.8 03/18/2021     03/18/2021        Discharge diet:   Diet Instructions     Diet: Regular      Discharge Diet: Regular        Activity at Discharge:  Activity Instructions     Bathing Restrictions      Sitz bath is OK. Shower is OK    Type of Restriction: Bathing    Bathing Restrictions: No Tub Bath    Pelvic Rest      Nothing in the vagina for 6 weeks (no sex, douching or tampon use)    Sexual Activity Restrictions      Type of Restriction: Sex    Explain Sexual Activity Restrictions: no sex for 6 weeks          Call MD if you experience heavy vaginal bleeding, frequent passage of clots, foul odor of lochia, increased pain, fever > 100.4F or any other concerns.    Discharge condition: Stable  Disposition: Home    Discharge Meds:     Your medication list      START taking these medications      Instructions Last Dose Given Next Dose Due   acetaminophen 500 MG tablet  Commonly known as: TYLENOL      Take 2 tablets by mouth Every 6 (Six) Hours.       ibuprofen 800 MG tablet  Commonly known as: ADVIL,MOTRIN      Take 1 tablet by mouth Every 8 (Eight) Hours.       lanolin ointment      Apply  topically to the appropriate area as directed Every 1 (One) Hour As Needed for Dry Skin (nipple pain).          CONTINUE taking these medications      Instructions Last Dose Given Next Dose Due   albuterol sulfate  (90 Base) MCG/ACT inhaler  Commonly known as: PROVENTIL HFA;VENTOLIN HFA;PROAIR HFA      Inhale 2 puffs Every 4 (Four) Hours As Needed for Wheezing.       cetirizine 10 MG tablet  Commonly known as: zyrTEC      Take 10 mg by mouth Daily.       prenatal (CLASSIC) vitamin tablet  Generic drug: prenatal vitamin      Take 1 tablet by mouth Daily.             Where to Get Your Medications      These medications were sent to [facility] [place] [address]    Hours: Monday through Friday  7:00am to 5:00pm Phone: [phone]   · acetaminophen 500 MG tablet  · ibuprofen 800 MG tablet  · lanolin ointment         Follow Up:  Aliyah Felder MD  [address] [place], [place] [id number] [phone]    Schedule an appointment as soon as possible for a visit in 3 week(s)  Postpartum mood check    Sony Francisco MD  [facility] [address]  [address]  [place] [id number] [phone]            Time Spent: >30 minutes was spent in counseling, medication reconciliation & coordination of care for this patient.    Signature  Aliyah Felder MD  [facility] [address], [place] [address]  Office: [phone]      This document has been electronically signed by Aliyah Felder MD on March 18, 2021 08:24 CDT

## 2021-03-19 NOTE — PAYOR COMM NOTE
"Nel Lipscomb  [facility]  P: [phone]  F: [phone]    [facility]#[id number]    Jossie Vidales (25 y.o. Female)     Date of Birth Social Security Number Address Home Phone MRN    1995  [address] [place] [address] [phone] [id number]    Hindu Marital Status          None        Admission Date Admission Type Admitting Provider Attending Provider Department, Room/Bed    3/17/21 Elective Aliyah Felder MD  [facility] MOTHER BABY, M755/1    Discharge Date Discharge Disposition Discharge Destination        3/18/2021 Home or Self Care              Attending Provider: (none)   Allergies: No Known Allergies    Isolation: None   Infection: None   Code Status: Prior    Ht: 157.5 cm (62\")   Wt: 73.9 kg (163 lb)    Admission Cmt: None   Principal Problem:  (normal spontaneous vaginal delivery) [O80]                 Active Insurance as of 3/17/2021     Primary Coverage     Payor Plan Insurance Group Employer/Plan Group    [place] [id number]     Payor Plan Address Payor Plan Phone Number Payor Plan Fax Number Effective Dates    [employer] [address] [phone]  2020 - None Entered    [facility] [id number]       Subscriber Name Subscriber Birth Date Member ID       JOSSIE VIDALES 1995 [id number]                 Emergency Contacts      (Rel.) Home Phone Work Phone Mobile Phone    ElliotKassidy (Mother) [phone] -- [phone]    Eb Vidales (Spouse) [phone] -- [phone]    ElliotSanjiv (Father) [phone] -- [phone]               Discharge Summary      Aliyah Felder MD at 21 0824          [facility] - Discharge Summary from Vaginal Delivery     Patient Name: Jossie Vidales  MRN: [id number]  : 1995  Admit Date: 3/17/2021  Discharge Date: 3/18/2021  Admitting Physician: Aliyah " MD Bradford  Discharge Physician: Aliyah Felder MD     Admit/Discharge Diagnoses:  Active Hospital Problems    Diagnosis  POA   • ** (normal spontaneous vaginal delivery) [O80]  Not Applicable      Resolved Hospital Problems    Diagnosis Date Resolved POA   • Post term pregnancy at 41 weeks gestation [O48.0, Z3A.41] 2021 Yes   • Insufficient prenatal care [O09.30] 2021 Not Applicable   • Supervision of normal pregnancy [Z34.90] 2021 Not Applicable        Hospital Course:  Swapna Vidales is a  who was admitted on 3/17/2021 for IOL at 41w5d GA, she was started on pitocin and progressed quickly in labor.  She had an uncomplicated vaginal delivery and postpartum course was unremarkable. She expressed desire for discharge on PPD# 1.       Procedures Performed:   Vaginal, Spontaneous      History:     The patient had a Vaginal, Spontaneous  on 3/17/2021 . She delivered a Female  infant that weighed 7 lb 8 oz (3402 g) . Apgars were 8  and 9  at one and five minutes, respectively. Delivery complications included None . Lacerations: None .        Subjective:  The patient feels well. Pain is well controlled with current medications. The baby is well. The patient is ambulating well. The patient is tolerating a normal diet. Lochia minimal.  Birth control plan: ?partner vasectomy vs POPs vs Nexplanon.  Feeding method: Breastfeed.     Objective:  Vitals:    21 0510   BP: 124/71   Pulse: 68   Resp:    Temp: 97.9 °F (36.6 °C)   SpO2: 99%      General: alert, well appearing, in no apparent distress  CVS: RRR  RESP: CTAB, normal effort  Uterine Fundus: firm  Ext: good ROM and strength     Labs at Discharge:  Lab Results   Component Value Date    WBC 11.70 (H) 2021    HGB 11.1 (L) 2021    HCT 33.8 (L) 2021    MCV 82.8 2021     2021        Discharge diet:   Diet Instructions     Diet: Regular      Discharge Diet: Regular        Activity at  Discharge:  Activity Instructions     Bathing Restrictions      Sitz bath is OK. Shower is OK    Type of Restriction: Bathing    Bathing Restrictions: No Tub Bath    Pelvic Rest      Nothing in the vagina for 6 weeks (no sex, douching or tampon use)    Sexual Activity Restrictions      Type of Restriction: Sex    Explain Sexual Activity Restrictions: no sex for 6 weeks          Call MD if you experience heavy vaginal bleeding, frequent passage of clots, foul odor of lochia, increased pain, fever > 100.4F or any other concerns.    Discharge condition: Stable  Disposition: Home    Discharge Meds:     Your medication list      START taking these medications      Instructions Last Dose Given Next Dose Due   acetaminophen 500 MG tablet  Commonly known as: TYLENOL      Take 2 tablets by mouth Every 6 (Six) Hours.       ibuprofen 800 MG tablet  Commonly known as: ADVIL,MOTRIN      Take 1 tablet by mouth Every 8 (Eight) Hours.       lanolin ointment      Apply  topically to the appropriate area as directed Every 1 (One) Hour As Needed for Dry Skin (nipple pain).          CONTINUE taking these medications      Instructions Last Dose Given Next Dose Due   albuterol sulfate  (90 Base) MCG/ACT inhaler  Commonly known as: PROVENTIL HFA;VENTOLIN HFA;PROAIR HFA      Inhale 2 puffs Every 4 (Four) Hours As Needed for Wheezing.       cetirizine 10 MG tablet  Commonly known as: zyrTEC      Take 10 mg by mouth Daily.       prenatal (CLASSIC) vitamin tablet  Generic drug: prenatal vitamin      Take 1 tablet by mouth Daily.             Where to Get Your Medications      These medications were sent to [facility] [place] [address]    Hours: Monday through Friday 7:00am to 5:00pm Phone: [phone]   · acetaminophen 500 MG tablet  · ibuprofen 800 MG tablet  · lanolin ointment         Follow Up:  Aliyah Felder MD  [address] [facility] [address] [phone]    Schedule  an appointment as soon as possible for a visit in 3 week(s)  Postpartum mood check    Sony Francisco MD  [facility] [address]  [address]  [place] [id number] [phone]            Time Spent: >30 minutes was spent in counseling, medication reconciliation & coordination of care for this patient.    Signature  Aliyah Felder MD  [facility] [address], [place] [address]  Office: [phone]      This document has been electronically signed by Aliyah Felder MD on March 18, 2021 08:24 CDT            Electronically signed by Aliyah Felder MD at 03/18/21 0825       Discharge Order (From admission, onward)     Start     Ordered    03/18/21 0823  Discharge patient  Once     Expected Discharge Date: 03/18/21    Discharge Disposition: Home or Self Care    Physician of Record for Attribution - Please select from Treatment Team: ALIYAH FELDER [[id number]]    Review needed by CMO to determine Physician of Record: No       Question Answer Comment   Physician of Record for Attribution - Please select from Treatment Team ALIYAH FELDER    Review needed by CMO to determine Physician of Record No        03/18/21 0823

## 2021-03-23 ENCOUNTER — TELEPHONE (OUTPATIENT)
Dept: LACTATION | Facility: HOSPITAL | Age: 26
End: 2021-03-23

## 2021-03-23 LAB — CANNABINOIDS UR QL CFM: POSITIVE

## 2022-05-23 ENCOUNTER — OFFICE VISIT (OUTPATIENT)
Dept: OBSTETRICS AND GYNECOLOGY | Facility: CLINIC | Age: 27
End: 2022-05-23

## 2022-05-23 VITALS
HEIGHT: 63 IN | DIASTOLIC BLOOD PRESSURE: 80 MMHG | BODY MASS INDEX: 24.56 KG/M2 | SYSTOLIC BLOOD PRESSURE: 118 MMHG | WEIGHT: 138.6 LBS

## 2022-05-23 DIAGNOSIS — F41.9 ANXIETY: ICD-10-CM

## 2022-05-23 DIAGNOSIS — N94.6 DYSMENORRHEA: Primary | ICD-10-CM

## 2022-05-23 DIAGNOSIS — F32.A DEPRESSION, UNSPECIFIED DEPRESSION TYPE: ICD-10-CM

## 2022-05-23 DIAGNOSIS — N94.3 PMS (PREMENSTRUAL SYNDROME): ICD-10-CM

## 2022-05-23 LAB
B-HCG UR QL: NEGATIVE
EXPIRATION DATE: NORMAL
INTERNAL NEGATIVE CONTROL: NORMAL
INTERNAL POSITIVE CONTROL: NORMAL
Lab: NORMAL

## 2022-05-23 PROCEDURE — 81025 URINE PREGNANCY TEST: CPT | Performed by: STUDENT IN AN ORGANIZED HEALTH CARE EDUCATION/TRAINING PROGRAM

## 2022-05-23 PROCEDURE — 99214 OFFICE O/P EST MOD 30 MIN: CPT | Performed by: STUDENT IN AN ORGANIZED HEALTH CARE EDUCATION/TRAINING PROGRAM

## 2022-05-23 RX ORDER — NORETHINDRONE ACETATE AND ETHINYL ESTRADIOL AND FERROUS FUMARATE 1MG-20(24)
1 KIT ORAL DAILY
Qty: 28 TABLET | Refills: 12 | Status: SHIPPED | OUTPATIENT
Start: 2022-05-23 | End: 2023-01-12 | Stop reason: HOSPADM

## 2022-05-23 NOTE — PROGRESS NOTES
[facility]  Gynecology  Date of Service: 2022    CC: dysmenorrhea, anxiety/depression    HPI  Swapna Vidales is a 26 y.o.  premenopausal female who presents with complaints of painful periods, anxiety and depression.    Patient states periods are regular, monthly, last about 1 week. Changes pad or tampon about every 3 hours. Cramps bad for about 3 days, feels them in legs, abdomen, back. Sometimes feels sick/nauseous when cramping.  Had been taking Midol and Tylenol with some help. Used pill previously, some difficulty taking daily but was young. IUD-had pain and lost weight. Now using condoms.     Has also been feeling down and depressed. States was on antidepressant after first kid. Unsure name. Does remember with Buspar had headaches. Feels down, tired, depressed, worse before menses. Denies any SI/HI. Can talk to family member if developed worsening mood/SI.     Last seen after  with Dr. Felder 3/17/21. Was planning vasectomy, but partner decided not to. Had scant PNC that pregnancy not seen from 11 to 41 weeks.    No h/o HTN, liver problems, VTE, nonsmoker.     Denies any vaginal itching, burning, irritation, or discharge. Denies any abnormal uterine bleeding. Denies any sexual dysfunction concerns. Denies any urinary symptoms including incontinence, dysuria, frequency, urgency, nocturia.    ROS  Review of Systems   Constitutional: Negative.    HENT: Negative.    Respiratory: Negative.    Cardiovascular: Negative.    Gastrointestinal: Negative.    Genitourinary: Positive for menstrual problem.   Musculoskeletal: Negative.    Skin: Negative.    Psychiatric/Behavioral: Positive for dysphoric mood.       GYN HISTORY  Menses: see above  Last pap smear: due 2023  Last Completed Pap Smear          PAP SMEAR (Every 3 Years) Next due on 2020  Liquid-based Pap Smear, Screening    2017  Liquid-based Pap Smear, Screening               Contraception: condoms     OB HISTORY  OB History    Para Term  AB Living   4 3 3 0 1 3   SAB IAB Ectopic Molar Multiple Live Births   1 0 0   0 3      # Outcome Date GA Lbr [place]/2nd Weight Sex Delivery Anes PTL Lv   4 Term 21 41w5d 01:36 / 00:19 3402 g (7 lb 8 oz) F Vag-Spont None N VINCENT   3 Term 17 40w4d 05:59 / 01:11 3374 g (7 lb 7 oz) M Vag-Spont None N VINCENT   2 SAB 2016           1 Term 13 39w3d  2580 g (5 lb 11 oz) F Vag-Spont OTHER N VINCENT      Birth Comments: IV pain medicine      PAST MEDICAL HISTORY  Past Medical History:   Diagnosis Date   • Allergic rhinitis    • Anxiety    • Genital herpes simplex    • Headache    • History of postpartum depression    • Ovarian cyst    • Pelvic pain     C/O pelvic pain    • Substance abuse (HCC)     MARIJUANA   • Varicella      PAST SURGICAL HISTORY  Past Surgical History:   Procedure Laterality Date   • COLONOSCOPY N/A 2018    Procedure: COLONOSCOPY;  Surgeon: Jimmie Reilly MD;  Location: [facility] ENDOSCOPY;  Service: Gastroenterology   • ENDOSCOPY N/A 2018    Procedure: ESOPHAGOGASTRODUODENOSCOPY;  Surgeon: Jimmie Reilly MD;  Location: [facility] ENDOSCOPY;  Service: Gastroenterology   • VAGINAL DELIVERY  2013    Spontaneous vaginal delivery at term.   • WISDOM TOOTH EXTRACTION       FAMILY HISTORY  Family History   Problem Relation Age of Onset   • Hyperlipidemia Father         High Cholesterol   • Heart block Father    • Diabetes Father    • Hypertension Father    • Ovarian cysts Sister    • Thyroid disease Mother         hypothyroidism   • Hypertension Mother    • Hyperlipidemia Mother    • Heart disease Other    • Hypertension Other    • Diabetes Other    • No Known Problems Son    • No Known Problems Daughter    • No Known Problems Brother      SOCIAL HISTORY  Social History     Socioeconomic History   • Marital status:      Spouse name: Eb   • Number of children: 2   Tobacco Use   • Smoking status:  "Never Smoker   • Smokeless tobacco: Never Used   Substance and Sexual Activity   • Alcohol use: No   • Drug use: Not Currently     Types: Marijuana   • Sexual activity: Yes     Comment: LAST PAP SMEAR 17 NEGATIVE      ALLERGIES  No Known Allergies  HOME MEDICATIONS  Prior to Admission medications    Medication Sig Start Date End Date Taking? Authorizing Provider   albuterol sulfate  (90 Base) MCG/ACT inhaler Inhale 2 puffs Every 6 (Six) Hours As Needed for Wheezing or Shortness of Air. 21  Yes Promise Viramontes PA-C   loratadine (CLARITIN) 10 MG tablet Take 10 mg by mouth Daily.   Yes Emergency, Nurse Epic, RN   methylPREDNISolone (MEDROL) 4 MG dose pack Take as directed on package instructions. 21   Promise Viramontes PA-C   norethindrone-ethinyl estradiol-ferrous fumarate (LOESTIN 24 FE) 1-20 MG-MCG(24) per tablet Take 1 tablet by mouth Daily. 22  Ary Santana DO   sertraline (Zoloft) 50 MG tablet Take 1 tablet by mouth Daily. 22  Ary Santana DO     PE  /80 (BP Location: Left arm, Patient Position: Sitting, Cuff Size: Adult)   Ht 160 cm (63\")   Wt 62.9 kg (138 lb 9.6 oz)   Breastfeeding No   BMI 24.55 kg/m²        General: Alert, healthy, no distress, well nourished and well developed.  Neurologic: Alert, oriented to person, place, and time.  Gait normal.  Cranial nerves II-XII grossly intact.  HEENT: Normocephalic, atraumatic.  Extraocular muscles intact.  Lungs: Normal respiratory effort.   Heart: Regular rate and rhythm.    Abdomen: Soft, non-tender, non-distended,no masses, no hepatosplenomegaly, no hernia.  Skin: No rash, no lesions.  Extremities: No cyanosis, clubbing or edema.  PELVIC EXAM:  Deferred, pap UTD no issues, scheduled for problem visit    IMPRESSION  Swapna Vidales is a 26 y.o.  presenting for discussion of dysmenorrhea, \"severe PMS\" with depressive symptoms worse prior to menses.    PLAN    1. Dysmenorrhea  - Regular " menses but worsening dysmenorrhea since birth of last child  - Used OCPs in teens and had trouble taking regularly, IUD had pain and lost weight, currently using condoms but wanting to avoid pregnancy  - Discussed options including OCP, IUD, Nexplanon, DMPA, patch, ring; patient interested in trialing OCP, now that she takes other medications feels she will be better able to take daily  - Discussed taking OCP same time daily, setting alarm; if missed dose take as soon as remember even if nearing time of next dose; if miss >1 dose use back up x 1 week  - POC Pregnancy, Urine    2. Depression, PMS (premenstrual syndrome)  - Reports depressive symptoms, nausea, cramping before and during start of period  - Discussed use of SSRIs for PMS, can be used continuously or before cycle starts each month; will start continuous dosing of Zoloft (patient unsure what she used previously other than Buspar gave her headaches); will refer to FM for continued management depression; we discussed I can start medication but often PCP better at titrating dose, if ineffective or inadequately effective may be able to offer options that may work better for patient  - Ambulatory Referral to Family Practice    FU 3-4 months to evaluate symptom control                 This document has been electronically signed by Ary Santana DO on May 29, 2022 19:58 CDT

## 2022-08-18 ENCOUNTER — DOCUMENTATION (OUTPATIENT)
Dept: OBSTETRICS AND GYNECOLOGY | Facility: CLINIC | Age: 27
End: 2022-08-18

## 2022-08-18 NOTE — PROGRESS NOTES
I called this patient about moving her appointment on 8-23.  After talking to the patient about her issues she was being seen for, we came to the agreement that she would call us when she needs us.  She has started exercising and eating better since her appointment with Dr. Santana and feels so much better.  She is not taking birth control or the Zoloft.  She didn't like the way they made her feel.

## 2023-01-12 ENCOUNTER — LAB (OUTPATIENT)
Dept: LAB | Facility: HOSPITAL | Age: 28
End: 2023-01-12
Payer: MEDICAID

## 2023-01-12 ENCOUNTER — CLINICAL SUPPORT (OUTPATIENT)
Dept: OBSTETRICS AND GYNECOLOGY | Facility: CLINIC | Age: 28
End: 2023-01-12
Payer: MEDICAID

## 2023-01-12 VITALS — SYSTOLIC BLOOD PRESSURE: 116 MMHG | DIASTOLIC BLOOD PRESSURE: 68 MMHG | WEIGHT: 134 LBS | BODY MASS INDEX: 23.74 KG/M2

## 2023-01-12 DIAGNOSIS — O20.9 BLEEDING IN EARLY PREGNANCY: ICD-10-CM

## 2023-01-12 DIAGNOSIS — Z32.00 PREGNANCY EXAMINATION OR TEST, PREGNANCY UNCONFIRMED: Primary | ICD-10-CM

## 2023-01-12 DIAGNOSIS — Z32.01 POSITIVE PREGNANCY TEST: Primary | ICD-10-CM

## 2023-01-12 LAB
B-HCG UR QL: POSITIVE
EXPIRATION DATE: ABNORMAL
HCG INTACT+B SERPL-ACNC: NORMAL MIU/ML
INTERNAL NEGATIVE CONTROL: NEGATIVE
INTERNAL POSITIVE CONTROL: POSITIVE
Lab: ABNORMAL

## 2023-01-12 PROCEDURE — 36415 COLL VENOUS BLD VENIPUNCTURE: CPT

## 2023-01-12 PROCEDURE — 84702 CHORIONIC GONADOTROPIN TEST: CPT | Performed by: OBSTETRICS & GYNECOLOGY

## 2023-01-12 PROCEDURE — 81025 URINE PREGNANCY TEST: CPT | Performed by: OBSTETRICS & GYNECOLOGY

## 2023-01-12 NOTE — PROGRESS NOTES
The patient is here today with her   for her 1st prenatal appointment. Her LMP is approximately 11/22/22. She had a positive home pregnancy test on Saturday. She started bleeding on Monday. Then, she finally stopped. She started bleeding again yesterday afternoon. She says it is bright red in color. She is still bleeding some today. Her UPT in the office today is positive. She is Rh positive, so a Rhogam is not needed. I consulted with Dr. Taylor. We will do a beta HCG quant today and will call her with results. We will make followup plans depending on what the result is. Patient verbalizes understanding.

## 2023-01-25 ENCOUNTER — INITIAL PRENATAL (OUTPATIENT)
Dept: OBSTETRICS AND GYNECOLOGY | Facility: CLINIC | Age: 28
End: 2023-01-25
Payer: MEDICAID

## 2023-01-25 ENCOUNTER — LAB (OUTPATIENT)
Dept: LAB | Facility: HOSPITAL | Age: 28
End: 2023-01-25
Payer: MEDICAID

## 2023-01-25 VITALS — SYSTOLIC BLOOD PRESSURE: 102 MMHG | DIASTOLIC BLOOD PRESSURE: 68 MMHG | BODY MASS INDEX: 23.91 KG/M2 | WEIGHT: 135 LBS

## 2023-01-25 DIAGNOSIS — O03.9 MISCARRIAGE: ICD-10-CM

## 2023-01-25 DIAGNOSIS — O03.9 MISCARRIAGE: Primary | ICD-10-CM

## 2023-01-25 LAB
DEPRECATED RDW RBC AUTO: 43.3 FL (ref 37–54)
ERYTHROCYTE [DISTWIDTH] IN BLOOD BY AUTOMATED COUNT: 13.2 % (ref 12.3–15.4)
HCT VFR BLD AUTO: 38.1 % (ref 34–46.6)
HGB BLD-MCNC: 12.7 G/DL (ref 12–15.9)
MCH RBC QN AUTO: 30.1 PG (ref 26.6–33)
MCHC RBC AUTO-ENTMCNC: 33.3 G/DL (ref 31.5–35.7)
MCV RBC AUTO: 90.3 FL (ref 79–97)
PLATELET # BLD AUTO: 252 10*3/MM3 (ref 140–450)
PMV BLD AUTO: 10.8 FL (ref 6–12)
RBC # BLD AUTO: 4.22 10*6/MM3 (ref 3.77–5.28)
WBC NRBC COR # BLD: 8.11 10*3/MM3 (ref 3.4–10.8)

## 2023-01-25 PROCEDURE — 85027 COMPLETE CBC AUTOMATED: CPT

## 2023-01-25 PROCEDURE — 99213 OFFICE O/P EST LOW 20 MIN: CPT

## 2023-01-25 PROCEDURE — 36415 COLL VENOUS BLD VENIPUNCTURE: CPT

## 2023-01-25 RX ORDER — MISOPROSTOL 200 UG/1
800 TABLET ORAL ONCE
Qty: 4 TABLET | Refills: 1 | Status: SHIPPED | OUTPATIENT
Start: 2023-01-25 | End: 2023-01-25

## 2023-01-25 NOTE — PROGRESS NOTES
Myrtle Vidales is a 27 y.o. Initial OB/Miscarriage     History of Present Illness  Patient was seen today for an initial OB appointment. Prelim US: CRL: 1.40cm, 7w5d, yolk sac 3.5mm, FHR absent, cervix 3.84cm, rt and lt ovary seen. No heart tones, fetal demise. Maternal blood type A positive.   Patient reports that she has been having heavy vaginal bleeding for 1 week. In the last day or so, the bleeding has slowed and is lighter.       Miscarriage  This is a new problem. The current episode started in the past 7 days. The problem has been waxing and waning. Pertinent negatives include no abdominal pain, chest pain, chills, coughing, fatigue, fever, nausea or vomiting. Nothing aggravates the symptoms. She has tried nothing for the symptoms.       The following portions of the patient's history were reviewed and updated as appropriate: allergies, current medications, past family history, past medical history, past social history, past surgical history and problem list.    Review of Systems   Constitutional: Negative for appetite change, chills, fatigue and fever.   Respiratory: Negative for apnea, cough, choking, chest tightness, shortness of breath, wheezing and stridor.    Cardiovascular: Negative for chest pain, palpitations and leg swelling.   Gastrointestinal: Negative for abdominal pain, constipation, diarrhea, nausea and vomiting.   Genitourinary: Positive for vaginal bleeding. Negative for amenorrhea, breast discharge, breast lump, breast pain, decreased libido, decreased urine volume, difficulty urinating, dyspareunia, dysuria, flank pain, frequency, genital sores, hematuria, menstrual problem, pelvic pain, pelvic pressure, urgency, urinary incontinence, vaginal discharge and vaginal pain.       Objective   Physical Exam  Vitals reviewed.   Constitutional:       General: She is awake. She is not in acute distress.     Appearance: Normal appearance. She is well-developed and normal  weight. She is not ill-appearing, toxic-appearing or diaphoretic.   Cardiovascular:      Rate and Rhythm: Normal rate and regular rhythm.      Pulses: Normal pulses.      Heart sounds: Normal heart sounds.   Pulmonary:      Effort: Pulmonary effort is normal.      Breath sounds: Normal breath sounds.   Abdominal:      General: Bowel sounds are normal.   Skin:     General: Skin is warm and dry.   Neurological:      Mental Status: She is alert and easily aroused.   Psychiatric:         Behavior: Behavior is cooperative.           Assessment & Plan   Diagnoses and all orders for this visit:    1. Miscarriage (Primary)  -     Cancel: CBC (No Diff); Future  -     CBC (No Diff); Future  -     US Non-ob Transvaginal; Future    Dr. Santana and myself discussed option for the miscarriage with the patient. Options included waiting it out and letting her body expel the contents naturally. Utilize Cytotec to help soften the cervix and expel the contents. Perform a D&C in the OR. RBA and s/e of each option discussed with patient. Patient would like to proceed with Cytotec at this time. Will obtain a CBC first. If H&H stable will prescribe Cytotec.   Discussed with Cytotec you may have heavier bleeding. If you are soaking more than 1 pad per hour for over 3 hours, go to the ER. If you start having a fever or chills go to the ER. If you start feeling light headed or dizzy, go to the ER.  Patient verbalized understanding of plan of care and when to seek additional attention.   RTC in 1 week for repeat ultrasound and appointment with MD.           This document has been electronically signed by DANIEL Wolff on January 25, 2023 10:32 CST

## (undated) DEVICE — SINGLE-USE BIOPSY FORCEPS: Brand: RADIAL JAW 4

## (undated) DEVICE — BITEBLOCK ENDO W/STRAP 60F A/ LF DISP